# Patient Record
Sex: MALE | Race: WHITE | NOT HISPANIC OR LATINO | Employment: FULL TIME | URBAN - METROPOLITAN AREA
[De-identification: names, ages, dates, MRNs, and addresses within clinical notes are randomized per-mention and may not be internally consistent; named-entity substitution may affect disease eponyms.]

---

## 2017-03-17 ENCOUNTER — HOSPITAL ENCOUNTER (OUTPATIENT)
Dept: NON INVASIVE DIAGNOSTICS | Facility: CLINIC | Age: 47
Discharge: HOME | End: 2017-03-17
Payer: COMMERCIAL

## 2017-03-17 DIAGNOSIS — R07.9 CHEST PAIN: ICD-10-CM

## 2017-03-17 LAB
CHEST PAIN STATEMENT: NORMAL
MAX DIASTOLIC BP: 84 MMHG
MAX HEART RATE: 157 BPM
MAX PREDICTED HEART RATE: 175 BPM
MAX. SYSTOLIC BP: 160 MMHG
PROTOCOL NAME: NORMAL
TARGET HR FORMULA: NORMAL
TEST INDICATION: NORMAL
TIME IN EXERCISE PHASE: 540 S

## 2017-03-17 PROCEDURE — 93017 CV STRESS TEST TRACING ONLY: CPT

## 2017-03-22 ENCOUNTER — TRANSCRIBE ORDERS (OUTPATIENT)
Dept: LAB | Facility: OTHER | Age: 47
End: 2017-03-22

## 2017-03-22 ENCOUNTER — APPOINTMENT (OUTPATIENT)
Dept: LAB | Facility: OTHER | Age: 47
End: 2017-03-22
Payer: COMMERCIAL

## 2017-03-22 DIAGNOSIS — E87.6 HYPOKALEMIA: ICD-10-CM

## 2017-03-22 LAB
ANION GAP SERPL CALCULATED.3IONS-SCNC: 9 MMOL/L (ref 4–13)
BUN SERPL-MCNC: 19 MG/DL (ref 5–25)
CALCIUM SERPL-MCNC: 9.3 MG/DL (ref 8.3–10.1)
CHLORIDE SERPL-SCNC: 101 MMOL/L (ref 100–108)
CO2 SERPL-SCNC: 27 MMOL/L (ref 21–32)
CREAT SERPL-MCNC: 1.3 MG/DL (ref 0.6–1.3)
GFR SERPL CREATININE-BSD FRML MDRD: 59.7 ML/MIN/1.73SQ M
GLUCOSE P FAST SERPL-MCNC: 96 MG/DL (ref 65–99)
POTASSIUM SERPL-SCNC: 4 MMOL/L (ref 3.5–5.3)
SODIUM SERPL-SCNC: 137 MMOL/L (ref 136–145)

## 2017-03-22 PROCEDURE — 36415 COLL VENOUS BLD VENIPUNCTURE: CPT

## 2017-03-22 PROCEDURE — 80048 BASIC METABOLIC PNL TOTAL CA: CPT

## 2017-03-24 ENCOUNTER — ALLSCRIPTS OFFICE VISIT (OUTPATIENT)
Dept: OTHER | Facility: OTHER | Age: 47
End: 2017-03-24

## 2017-07-01 ENCOUNTER — APPOINTMENT (OUTPATIENT)
Dept: LAB | Facility: HOSPITAL | Age: 47
End: 2017-07-01
Payer: COMMERCIAL

## 2017-07-01 ENCOUNTER — TRANSCRIBE ORDERS (OUTPATIENT)
Dept: ADMINISTRATIVE | Facility: HOSPITAL | Age: 47
End: 2017-07-01

## 2017-07-01 DIAGNOSIS — Z00.8 HEALTH EXAMINATION IN POPULATION SURVEY: Primary | ICD-10-CM

## 2017-07-01 DIAGNOSIS — Z00.8 HEALTH EXAMINATION IN POPULATION SURVEY: ICD-10-CM

## 2017-07-01 LAB
CHOLEST SERPL-MCNC: 232 MG/DL (ref 50–200)
EST. AVERAGE GLUCOSE BLD GHB EST-MCNC: 103 MG/DL
HBA1C MFR BLD: 5.2 % (ref 4.2–6.3)
HDLC SERPL-MCNC: 46 MG/DL (ref 40–60)
LDLC SERPL CALC-MCNC: 160 MG/DL (ref 0–100)
TRIGL SERPL-MCNC: 130 MG/DL

## 2017-07-01 PROCEDURE — 80061 LIPID PANEL: CPT

## 2017-07-01 PROCEDURE — 36415 COLL VENOUS BLD VENIPUNCTURE: CPT

## 2017-07-01 PROCEDURE — 83036 HEMOGLOBIN GLYCOSYLATED A1C: CPT

## 2018-01-14 VITALS
SYSTOLIC BLOOD PRESSURE: 104 MMHG | RESPIRATION RATE: 16 BRPM | WEIGHT: 176 LBS | BODY MASS INDEX: 27.57 KG/M2 | HEART RATE: 80 BPM | DIASTOLIC BLOOD PRESSURE: 78 MMHG
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
## 2018-02-04 DIAGNOSIS — I10 ESSENTIAL HYPERTENSION: Primary | ICD-10-CM

## 2018-02-04 RX ORDER — TRIAMTERENE AND HYDROCHLOROTHIAZIDE 37.5; 25 MG/1; MG/1
TABLET ORAL
Qty: 90 TABLET | Refills: 0 | Status: SHIPPED | OUTPATIENT
Start: 2018-02-04 | End: 2018-05-14 | Stop reason: SDUPTHER

## 2018-03-16 DIAGNOSIS — I10 ESSENTIAL HYPERTENSION: Primary | ICD-10-CM

## 2018-03-19 DIAGNOSIS — I10 ESSENTIAL HYPERTENSION: ICD-10-CM

## 2018-03-19 RX ORDER — LOSARTAN POTASSIUM 50 MG/1
TABLET ORAL
Qty: 90 TABLET | Refills: 1 | Status: SHIPPED | OUTPATIENT
Start: 2018-03-19 | End: 2018-12-24 | Stop reason: SDUPTHER

## 2018-03-19 RX ORDER — LOSARTAN POTASSIUM 50 MG/1
TABLET ORAL
Qty: 90 TABLET | Refills: 0 | Status: SHIPPED | OUTPATIENT
Start: 2018-03-19 | End: 2018-03-19 | Stop reason: SDUPTHER

## 2018-05-14 DIAGNOSIS — I10 ESSENTIAL HYPERTENSION: ICD-10-CM

## 2018-05-14 RX ORDER — TRIAMTERENE AND HYDROCHLOROTHIAZIDE 37.5; 25 MG/1; MG/1
TABLET ORAL
Qty: 90 TABLET | Refills: 0 | Status: SHIPPED | OUTPATIENT
Start: 2018-05-14 | End: 2018-08-16 | Stop reason: SDUPTHER

## 2018-06-15 ENCOUNTER — APPOINTMENT (OUTPATIENT)
Dept: RADIOLOGY | Facility: CLINIC | Age: 48
End: 2018-06-15
Payer: COMMERCIAL

## 2018-06-15 ENCOUNTER — OFFICE VISIT (OUTPATIENT)
Dept: URGENT CARE | Facility: CLINIC | Age: 48
End: 2018-06-15
Payer: COMMERCIAL

## 2018-06-15 VITALS
OXYGEN SATURATION: 98 % | WEIGHT: 177 LBS | TEMPERATURE: 98.6 F | BODY MASS INDEX: 27.78 KG/M2 | HEART RATE: 78 BPM | DIASTOLIC BLOOD PRESSURE: 90 MMHG | RESPIRATION RATE: 18 BRPM | SYSTOLIC BLOOD PRESSURE: 134 MMHG | HEIGHT: 67 IN

## 2018-06-15 DIAGNOSIS — T14.90XA TRAUMA: Primary | ICD-10-CM

## 2018-06-15 DIAGNOSIS — M54.50 ACUTE LEFT-SIDED LOW BACK PAIN WITHOUT SCIATICA: ICD-10-CM

## 2018-06-15 PROCEDURE — 72100 X-RAY EXAM L-S SPINE 2/3 VWS: CPT

## 2018-06-15 PROCEDURE — S9088 SERVICES PROVIDED IN URGENT: HCPCS | Performed by: PHYSICIAN ASSISTANT

## 2018-06-15 PROCEDURE — 99213 OFFICE O/P EST LOW 20 MIN: CPT | Performed by: PHYSICIAN ASSISTANT

## 2018-06-15 PROCEDURE — 72072 X-RAY EXAM THORAC SPINE 3VWS: CPT

## 2018-06-15 RX ORDER — KETOROLAC TROMETHAMINE 30 MG/ML
30 INJECTION, SOLUTION INTRAMUSCULAR; INTRAVENOUS ONCE
Status: COMPLETED | OUTPATIENT
Start: 2018-06-15 | End: 2018-06-15

## 2018-06-15 RX ORDER — TRAMADOL HYDROCHLORIDE 50 MG/1
50 TABLET ORAL EVERY 6 HOURS PRN
Qty: 12 TABLET | Refills: 0 | Status: SHIPPED | OUTPATIENT
Start: 2018-06-15 | End: 2019-01-16

## 2018-06-15 RX ADMIN — KETOROLAC TROMETHAMINE 30 MG: 30 INJECTION, SOLUTION INTRAMUSCULAR; INTRAVENOUS at 19:26
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
## 2018-06-15 NOTE — PATIENT INSTRUCTIONS
Thoracolumbar x-ray performed in office-  Patient was given Toradol IM 30 mg for pain in office  Closely monitor for signs and symptoms of a concussion  If back pain worsens or there are other neurologic changes immediately proceed to the emergency room  Follow up with PCP in 3-5 days  Acute Low Back Pain   AMBULATORY CARE:   Acute low back pain  is sudden discomfort in your lower back area that lasts for up to 6 weeks  The discomfort makes it difficult to tolerate activity  Common symptoms include the following:   · Back stiffness or spasms    · Pain down the back or side of one leg    · Holding yourself in an unusual position or posture to decrease your back pain    · Not being able to find a sitting position that is comfortable    · Slow increase in your pain for 24 to 48 hours after you stress your back    · Tenderness on your lower back or severe pain when you move your back  Seek immediate care for the following symptoms:   · Severe pain    · Sudden stiffness and heaviness in both buttocks down to both legs    · Numbness or weakness in one leg, or pain in both legs    · Numbness in your genital area or across your lower back    · Unable to control your urine or bowel movements  Contact your healthcare provider if:   · You have a fever  · You have pain at night or when you rest     · Your pain does not get better with treatment  · You have pain that worsens when you cough or sneeze  · You suddenly feel something pop or snap in your back  · You have questions or concerns about your condition or care  The goal of treatment for acute low back pain  is to relieve your pain and help you tolerate activity  Most people with acute lower back pain get better within 4 to 6 weeks  You may need any of the following:  · Acetaminophen  decreases pain  It is available without a doctor's order  Ask how much to take and how often to take it  Follow directions   Acetaminophen can cause liver damage if not taken correctly  · NSAIDs  help decrease swelling and pain  This medicine is available with or without a doctor's order  NSAIDs can cause stomach bleeding or kidney problems in certain people  If you take blood thinner medicine, always ask your healthcare provider if NSAIDs are safe for you  Always read the medicine label and follow directions  · Prescription pain medicine  may be given  Ask your healthcare provider how to take this medicine safely  · Muscle relaxers  decrease pain by relaxing the muscles in your lower spine  Manage your symptoms:   · Stay active  as much as you can without causing more pain  Bed rest could make your back pain worse  Start with some light exercises such as walking  Avoid heavy lifting until your pain is gone  Ask for more information about the activities or exercises that are right for you  · Ice  helps decrease swelling, pain, and muscle spams  Put crushed ice in a plastic bag  Cover it with a towel  Place it on your lower back for 20 to 30 minutes every 2 hours  Do this for about 2 to 3 days after your pain starts, or as directed  · Heat  helps decrease pain and muscle spasms  Start to use heat after treatment with ice has stopped  Use a small towel dampened with warm water or a heating pad, or sit in a warm bath  Apply heat on the area for 20 to 30 minutes every 2 hours for as many days as directed  Alternate heat and ice  Prevent acute low back pain:   · Use proper body mechanics  ¨ Bend at the hips and knees when you  objects  Do not bend from the waist  Use your leg muscles as you lift the load  Do not use your back  Keep the object close to your chest as you lift it  Try not to twist or lift anything above your waist     ¨ Change your position often when you stand for long periods of time  Rest one foot on a small box or footrest, and then switch to the other foot often  ¨ Try not to sit for long periods of time   When you do, sit in a straight-backed chair with your feet flat on the floor  Never reach, pull, or push while you are sitting  · Do exercises that strengthen your back muscles  Warm up before you exercise  Ask your healthcare provider the best exercises for you  · Maintain a healthy weight  Ask your healthcare provider how much you should weigh  Ask him to help you create a weight loss plan if you are overweight  Follow up with your healthcare provider as directed:  Return for a follow-up visit if you still have pain after 1 to 3 weeks of treatment  You may need to visit an orthopedist if your back pain lasts more than 12 weeks  Write down your questions so you remember to ask them during your visits  © 2017 [address] Information is for End User's use only and may not be sold, redistributed or otherwise used for commercial purposes  All illustrations and images included in CareNotes® are the copyrighted property of A D [employer]  or [address]  The above information is an  only  It is not intended as medical advice for individual conditions or treatments  Talk to your doctor, nurse or pharmacist before following any medical regimen to see if it is safe and effective for you  Concussion   AMBULATORY CARE:   A concussion  is a mild brain injury  It is usually caused by a bump or blow to the head from a fall, a motor vehicle crash, or a sports injury  Sometimes being forcefully shaken may cause a concussion  Common symptoms include the following:  Symptoms may occur right away, or they may appear days after the concussion   After the injury, you may have any of these symptoms:  · A mild to moderate headache    · Dizziness, loss of balance, or blurry vision    · Nausea or vomiting     · A change in mood, such as restlessness or irritability    · Trouble thinking, remembering things, or concentrating    · Ringing in the ears    · Drowsiness or decreased energy    · Changes in your normal sleeping pattern  Have someone else call 911 for the following:   · Someone tries to wake you and cannot do so  · You have a seizure, increasing confusion, or a change in personality  · Your speech becomes slurred, or you have new vision problems  Seek care immediately if:   · You have a severe headache that does not go away  · Someone tries to wake you and cannot do so  · You have a seizure, increasing confusion, or a change in personality  · Your speech becomes slurred, or you have new vision problems  · You have arm or leg weakness, numbness, or new problems with coordination  · You have blood or clear fluid coming out of the ears or nose  Contact your healthcare provider if:   · You have nausea or are vomiting  · You feel more sleepy than usual     · Your symptoms get worse  · Your symptoms last longer than 6 weeks after the injury  · You have questions or concerns about your condition or care  Manage a concussion:  Usually no treatment is needed for a mild concussion  Concussion symptoms usually go away within about 10 days  The following may be recommended to manage your symptoms:  · Rest  from physical and mental activities as directed  Mental activities are those that require thinking, concentration, and attention  You will need to rest until your symptoms are gone  Rest will allow you to recover from your concussion  Ask your healthcare provider when you can return to work and other daily activities  · Have someone stay with you for the first 24 hours after your injury  Your healthcare provider should be contacted if your symptoms get worse, or you develop new symptoms  · Do not participate in sports and physical activities until your healthcare provider says it is okay  They could make your symptoms worse or lead to another concussion  Your healthcare provider will tell you when it is okay for you to return to sports or physical activities  · Acetaminophen  helps to decrease pain  It is available without a doctor's order  Ask how much to take and how often to take it  Follow directions  Acetaminophen can cause liver damage if not taken correctly  · NSAIDs , such as ibuprofen, help decrease swelling and pain  NSAIDs can cause stomach bleeding or kidney problems in certain people  If you take blood thinner medicine, always ask your healthcare provider if NSAIDs are safe for you  Always read the medicine label and follow directions  Prevent another concussion:   · Wear protective sports equipment that fit properly  Helmets help decrease your risk of a serious brain injury  Talk to your healthcare provider about ways you can decrease your risk for a concussion if you play sports  · Wear your seat belt  every time you travel  This helps to decrease your risk of a head injury if you are in a car accident  Follow up with your healthcare provider as directed:  Write down your questions so you remember to ask them during your visits  © 2017 [address] Information is for End User's use only and may not be sold, redistributed or otherwise used for commercial purposes  All illustrations and images included in CareNotes® are the copyrighted property of [employer] , [employer]  or [employer]  The above information is an  only  It is not intended as medical advice for individual conditions or treatments  Talk to your doctor, nurse or pharmacist before following any medical regimen to see if it is safe and effective for you

## 2018-06-23 ENCOUNTER — APPOINTMENT (OUTPATIENT)
Dept: LAB | Facility: CLINIC | Age: 48
End: 2018-06-23

## 2018-06-23 ENCOUNTER — TRANSCRIBE ORDERS (OUTPATIENT)
Dept: ADMINISTRATIVE | Facility: HOSPITAL | Age: 48
End: 2018-06-23

## 2018-06-23 DIAGNOSIS — Z00.8 HEALTH EXAMINATION IN POPULATION SURVEY: Primary | ICD-10-CM

## 2018-06-23 DIAGNOSIS — Z00.8 HEALTH EXAMINATION IN POPULATION SURVEY: ICD-10-CM

## 2018-06-23 LAB
CHOLEST SERPL-MCNC: 245 MG/DL (ref 50–200)
EST. AVERAGE GLUCOSE BLD GHB EST-MCNC: 108 MG/DL
HBA1C MFR BLD: 5.4 % (ref 4.2–6.3)
HDLC SERPL-MCNC: 43 MG/DL (ref 40–60)
LDLC SERPL CALC-MCNC: 141 MG/DL (ref 0–100)
NONHDLC SERPL-MCNC: 202 MG/DL
TRIGL SERPL-MCNC: 305 MG/DL

## 2018-06-23 PROCEDURE — 36415 COLL VENOUS BLD VENIPUNCTURE: CPT

## 2018-06-23 PROCEDURE — 80061 LIPID PANEL: CPT

## 2018-06-23 PROCEDURE — 83036 HEMOGLOBIN GLYCOSYLATED A1C: CPT

## 2018-08-16 DIAGNOSIS — I10 ESSENTIAL HYPERTENSION: ICD-10-CM

## 2018-08-21 RX ORDER — TRIAMTERENE AND HYDROCHLOROTHIAZIDE 37.5; 25 MG/1; MG/1
TABLET ORAL
Qty: 90 TABLET | Refills: 0 | Status: SHIPPED | OUTPATIENT
Start: 2018-08-21 | End: 2018-11-18 | Stop reason: SDUPTHER

## 2018-11-18 DIAGNOSIS — I10 ESSENTIAL HYPERTENSION: ICD-10-CM

## 2018-11-18 RX ORDER — TRIAMTERENE AND HYDROCHLOROTHIAZIDE 37.5; 25 MG/1; MG/1
TABLET ORAL
Qty: 90 TABLET | Refills: 0 | Status: SHIPPED | OUTPATIENT
Start: 2018-11-18 | End: 2019-02-19 | Stop reason: SDUPTHER

## 2018-12-24 DIAGNOSIS — I10 ESSENTIAL HYPERTENSION: ICD-10-CM

## 2018-12-31 RX ORDER — LOSARTAN POTASSIUM 50 MG/1
TABLET ORAL
Qty: 90 TABLET | Refills: 1 | Status: SHIPPED | OUTPATIENT
Start: 2018-12-31 | End: 2019-07-14 | Stop reason: SDUPTHER

## 2019-01-16 ENCOUNTER — OFFICE VISIT (OUTPATIENT)
Dept: URGENT CARE | Facility: CLINIC | Age: 49
End: 2019-01-16
Payer: COMMERCIAL

## 2019-01-16 VITALS
RESPIRATION RATE: 18 BRPM | SYSTOLIC BLOOD PRESSURE: 112 MMHG | TEMPERATURE: 97.8 F | DIASTOLIC BLOOD PRESSURE: 82 MMHG | HEIGHT: 66 IN | BODY MASS INDEX: 28.61 KG/M2 | WEIGHT: 178 LBS | HEART RATE: 111 BPM | OXYGEN SATURATION: 97 %

## 2019-01-16 DIAGNOSIS — J06.9 UPPER RESPIRATORY TRACT INFECTION, UNSPECIFIED TYPE: Primary | ICD-10-CM

## 2019-01-16 LAB — S PYO AG THROAT QL: NEGATIVE

## 2019-01-16 PROCEDURE — 99213 OFFICE O/P EST LOW 20 MIN: CPT | Performed by: PHYSICIAN ASSISTANT

## 2019-01-16 PROCEDURE — S9088 SERVICES PROVIDED IN URGENT: HCPCS | Performed by: PHYSICIAN ASSISTANT

## 2019-01-16 PROCEDURE — 87070 CULTURE OTHR SPECIMN AEROBIC: CPT | Performed by: PHYSICIAN ASSISTANT

## 2019-01-16 RX ORDER — DEXTROMETHORPHAN HYDROBROMIDE AND PROMETHAZINE HYDROCHLORIDE 15; 6.25 MG/5ML; MG/5ML
5 SYRUP ORAL 4 TIMES DAILY PRN
Qty: 118 ML | Refills: 0 | Status: SHIPPED | OUTPATIENT
Start: 2019-01-16 | End: 2019-01-23 | Stop reason: ALTCHOICE

## 2019-01-16 RX ORDER — OMEPRAZOLE 20 MG/1
1 CAPSULE, DELAYED RELEASE ORAL DAILY
COMMUNITY

## 2019-01-16 NOTE — PROGRESS NOTES
[facility]        NAME: Silverio Castro is a 53 y o  male  : 1970    MRN: [id number]  DATE: 2019  TIME: 3:54 PM    Assessment and Plan   Upper respiratory tract infection, unspecified type [J06 9]  1  Upper respiratory tract infection, unspecified type  promethazine-dextromethorphan (PHENERGAN-DM) 6 25-15 mg/5 mL oral syrup    POCT rapid strepA    Throat culture         Patient Instructions     Neg rapid strep, likely viral  Continue tylenol and motrin  Follow up with PCP in 3-5 days  Proceed to  ER if symptoms worsen  Chief Complaint     Chief Complaint   Patient presents with    Sore Throat     x 3 days  complains of sore throat, BL ear pain, fever (max at home was 100)  History of Present Illness         71-year-old male complains of fever sore throat and ear pain for 3 days  No nausea vomiting  The cough keeping up at night  No chest pain or shortness of breath  He is taking over-the-counter cough syrup with no relief  He did get a flu shot this year  He has a history of tonsillectomy  Review of Systems   Review of Systems      Current Medications       Current Outpatient Prescriptions:     losartan (COZAAR) 50 mg tablet, TAKE 1 TABLET BY MOUTH DAILY  , Disp: 90 tablet, Rfl: 1    omeprazole (PriLOSEC) 20 mg delayed release capsule, Take 1 capsule by mouth daily, Disp: , Rfl:     triamterene-hydrochlorothiazide (MAXZIDE-25) 37 5-25 mg per tablet, TAKE 1 TABLET BY MOUTH DAILY AS DIRECTED , Disp: 90 tablet, Rfl: 0    promethazine-dextromethorphan (PHENERGAN-DM) 6 25-15 mg/5 mL oral syrup, Take 5 mL by mouth 4 (four) times a day as needed for cough, Disp: 118 mL, Rfl: 0    Current Allergies     Allergies as of 2019 - Reviewed 2019   Allergen Reaction Noted    Sulfa antibiotics  10/31/2016            The following portions of the patient's history were reviewed and updated as appropriate: allergies, current medications, past family history, past medical history, past social history, past surgical history and problem list      Past Medical History:   Diagnosis Date    Hypertension        Past Surgical History:   Procedure Laterality Date    CERVICAL DISC SURGERY         No family history on file  Medications have been verified  Objective   /82 (BP Location: Left arm, Patient Position: Sitting)   Pulse (!) 111   Temp 97 8 °F (36 6 °C) (Tympanic)   Resp 18   Ht 5' 6" (1 676 m)   Wt 80 7 kg (178 lb)   SpO2 97%   BMI 28 73 kg/m²        Physical Exam     Physical Exam   Constitutional: He appears well-developed and well-nourished  No distress  HENT:   Right Ear: Tympanic membrane, external ear and ear canal normal    Left Ear: Tympanic membrane, external ear and ear canal normal    Nose: Nose normal  Right sinus exhibits no maxillary sinus tenderness and no frontal sinus tenderness  Left sinus exhibits no maxillary sinus tenderness and no frontal sinus tenderness  Mouth/Throat: Posterior oropharyngeal erythema present  No oropharyngeal exudate or posterior oropharyngeal edema  Eyes: Pupils are equal, round, and reactive to light  Conjunctivae and EOM are normal  No scleral icterus  Neck: Normal range of motion  Neck supple  Cardiovascular: Normal rate, regular rhythm and normal heart sounds  Pulmonary/Chest: Effort normal and breath sounds normal  No respiratory distress  He has no wheezes  He has no rales  Abdominal: Soft  Bowel sounds are normal  He exhibits no distension and no mass  There is no tenderness  There is no rebound and no guarding  Lymphadenopathy:     He has no cervical adenopathy  Skin: Skin is warm and dry  No rash noted

## 2019-01-18 LAB — BACTERIA THROAT CULT: NORMAL

## 2019-01-23 ENCOUNTER — OFFICE VISIT (OUTPATIENT)
Dept: URGENT CARE | Facility: CLINIC | Age: 49
End: 2019-01-23
Payer: COMMERCIAL

## 2019-01-23 ENCOUNTER — APPOINTMENT (OUTPATIENT)
Dept: RADIOLOGY | Facility: CLINIC | Age: 49
End: 2019-01-23
Payer: COMMERCIAL

## 2019-01-23 VITALS
HEIGHT: 67 IN | SYSTOLIC BLOOD PRESSURE: 158 MMHG | BODY MASS INDEX: 27.94 KG/M2 | TEMPERATURE: 96.8 F | DIASTOLIC BLOOD PRESSURE: 102 MMHG | HEART RATE: 114 BPM | RESPIRATION RATE: 18 BRPM | WEIGHT: 178 LBS | OXYGEN SATURATION: 97 %

## 2019-01-23 DIAGNOSIS — H10.33 ACUTE CONJUNCTIVITIS OF BOTH EYES, UNSPECIFIED ACUTE CONJUNCTIVITIS TYPE: ICD-10-CM

## 2019-01-23 DIAGNOSIS — J32.9 RHINOSINUSITIS: ICD-10-CM

## 2019-01-23 DIAGNOSIS — R05.9 COUGH: Primary | ICD-10-CM

## 2019-01-23 DIAGNOSIS — J31.0 RHINOSINUSITIS: ICD-10-CM

## 2019-01-23 DIAGNOSIS — R05.9 COUGH: ICD-10-CM

## 2019-01-23 PROCEDURE — 71046 X-RAY EXAM CHEST 2 VIEWS: CPT

## 2019-01-23 PROCEDURE — S9088 SERVICES PROVIDED IN URGENT: HCPCS | Performed by: PHYSICIAN ASSISTANT

## 2019-01-23 PROCEDURE — 99213 OFFICE O/P EST LOW 20 MIN: CPT | Performed by: PHYSICIAN ASSISTANT

## 2019-01-23 RX ORDER — OFLOXACIN 3 MG/ML
1 SOLUTION/ DROPS OPHTHALMIC 4 TIMES DAILY
Qty: 5 ML | Refills: 0 | Status: SHIPPED | OUTPATIENT
Start: 2019-01-23 | End: 2019-05-15 | Stop reason: ALTCHOICE

## 2019-01-23 RX ORDER — AZITHROMYCIN 250 MG/1
TABLET, FILM COATED ORAL
Qty: 6 TABLET | Refills: 0 | Status: SHIPPED | OUTPATIENT
Start: 2019-01-23 | End: 2019-01-27

## 2019-01-23 RX ORDER — METHYLPREDNISOLONE 4 MG/1
TABLET ORAL
Qty: 21 TABLET | Refills: 0 | Status: SHIPPED | OUTPATIENT
Start: 2019-01-23 | End: 2019-05-15 | Stop reason: ALTCHOICE

## 2019-01-23 NOTE — PROGRESS NOTES
[facility]        NAME: Homero Mckee is a 53 y o  male  : 1970    MRN: [id number]  DATE: 2019  TIME: 9:30 AM    Assessment and Plan   Cough [R05]  1  Cough  XR chest pa & lateral    methylPREDNISolone 4 MG tablet therapy pack    azithromycin (ZITHROMAX) 250 mg tablet   2  Rhinosinusitis  methylPREDNISolone 4 MG tablet therapy pack    azithromycin (ZITHROMAX) 250 mg tablet   3  Acute conjunctivitis of both eyes, unspecified acute conjunctivitis type  ofloxacin (OCUFLOX) 0 3 % ophthalmic solution         Patient Instructions     Patient Instructions   Chest x-ray is clear  Due to 2 weeks of cough we can cover with azithromycin  We will use prednisone for the hoarse voice and congestion  Ocuflox this and for the pinkeye  Continue Mucinex D or Delsym over-the-counter  Follow up with PCP in 3-5 days  Proceed to  ER if symptoms worsen  Chief Complaint     Chief Complaint   Patient presents with    Cough     dry np    Cold Like Symptoms     going on 2 weeks  was here last week and has not improved   Eye Pain     woke up this morning with BL eyes pink and crusty  also itch and burn  History of Present Illness         53year-old male complains of 2 weeks of cough  He says he lost his voice  Says the cough is got worse  He reports a history of pneumonia and is concerned about that  Some chest congestion  No fever at home  He was taking promethazine from his last visit here  No nausea or vomiting  No shortness of breath        Review of Systems   Review of Systems      Current Medications       Current Outpatient Prescriptions:     losartan (COZAAR) 50 mg tablet, TAKE 1 TABLET BY MOUTH DAILY  , Disp: 90 tablet, Rfl: 1    omeprazole (PriLOSEC) 20 mg delayed release capsule, Take 1 capsule by mouth daily, Disp: , Rfl:     triamterene-hydrochlorothiazide (MAXZIDE-25) 37 5-25 mg per tablet, TAKE 1 TABLET BY MOUTH DAILY AS DIRECTED , Disp: 90 tablet, Rfl: 0   azithromycin (ZITHROMAX) 250 mg tablet, 2 tabs on day 1, then 1 tab daily for 4 days, Disp: 6 tablet, Rfl: 0    methylPREDNISolone 4 MG tablet therapy pack, Use as directed on package, Disp: 21 tablet, Rfl: 0    ofloxacin (OCUFLOX) 0 3 % ophthalmic solution, Apply 1 drop to eye 4 (four) times a day, Disp: 5 mL, Rfl: 0    Current Allergies     Allergies as of 01/23/2019 - Reviewed 01/23/2019   Allergen Reaction Noted    Sulfa antibiotics  10/31/2016            The following portions of the patient's history were reviewed and updated as appropriate: allergies, current medications, past family history, past medical history, past social history, past surgical history and problem list      Past Medical History:   Diagnosis Date    Hypertension        Past Surgical History:   Procedure Laterality Date    CERVICAL DISC SURGERY         No family history on file  Medications have been verified  Objective   BP (!) 158/102 (BP Location: Left arm, Patient Position: Sitting)   Pulse (!) 114   Temp (!) 96 8 °F (36 °C) (Temporal)   Resp 18   Ht 5' 7" (1 702 m)   Wt 80 7 kg (178 lb)   SpO2 97%   BMI 27 88 kg/m²        Physical Exam     Physical Exam   Constitutional: He appears well-developed and well-nourished  No distress  HENT:   Right Ear: Tympanic membrane, external ear and ear canal normal    Left Ear: Tympanic membrane, external ear and ear canal normal    Nose: Mucosal edema and rhinorrhea present  Right sinus exhibits no maxillary sinus tenderness and no frontal sinus tenderness  Left sinus exhibits no maxillary sinus tenderness and no frontal sinus tenderness  Mouth/Throat: Oropharynx is clear and moist  No posterior oropharyngeal erythema  Eyes: Pupils are equal, round, and reactive to light  EOM are normal  Right eye exhibits no chemosis and no discharge  Left eye exhibits no chemosis and no discharge  Right conjunctiva is injected  Left conjunctiva is injected  No scleral icterus     Neck: Normal range of motion  Neck supple  Cardiovascular: Normal rate, regular rhythm and normal heart sounds  Pulmonary/Chest: Effort normal and breath sounds normal  No respiratory distress  He has no wheezes  He has no rales  Coughing with mildly hoarse voice   Abdominal: Soft  Bowel sounds are normal  He exhibits no distension and no mass  There is no tenderness  There is no rebound and no guarding  Lymphadenopathy:     He has no cervical adenopathy  Skin: Skin is warm and dry  No rash noted

## 2019-01-23 NOTE — PATIENT INSTRUCTIONS
Chest x-ray is clear  Due to 2 weeks of cough we can cover with azithromycin  We will use prednisone for the hoarse voice and congestion  Ocuflox this and for the pinkeye  Continue Mucinex D or Delsym over-the-counter

## 2019-02-19 DIAGNOSIS — I10 ESSENTIAL HYPERTENSION: ICD-10-CM

## 2019-02-22 RX ORDER — TRIAMTERENE AND HYDROCHLOROTHIAZIDE 37.5; 25 MG/1; MG/1
TABLET ORAL
Qty: 90 TABLET | Refills: 5 | Status: SHIPPED | OUTPATIENT
Start: 2019-02-22 | End: 2020-04-13

## 2019-05-15 ENCOUNTER — OFFICE VISIT (OUTPATIENT)
Dept: FAMILY MEDICINE CLINIC | Facility: MEDICAL CENTER | Age: 49
End: 2019-05-15
Payer: COMMERCIAL

## 2019-05-15 VITALS
HEART RATE: 70 BPM | HEIGHT: 66 IN | BODY MASS INDEX: 28.37 KG/M2 | RESPIRATION RATE: 14 BRPM | SYSTOLIC BLOOD PRESSURE: 130 MMHG | WEIGHT: 176.5 LBS | DIASTOLIC BLOOD PRESSURE: 80 MMHG

## 2019-05-15 DIAGNOSIS — E78.1 ESSENTIAL HYPERTRIGLYCERIDEMIA: ICD-10-CM

## 2019-05-15 DIAGNOSIS — K76.0 FATTY LIVER DISEASE, NONALCOHOLIC: ICD-10-CM

## 2019-05-15 DIAGNOSIS — I10 ESSENTIAL HYPERTENSION: ICD-10-CM

## 2019-05-15 DIAGNOSIS — K21.9 GASTROESOPHAGEAL REFLUX DISEASE WITHOUT ESOPHAGITIS: Primary | ICD-10-CM

## 2019-05-15 DIAGNOSIS — Z13.29 SCREENING FOR THYROID DISORDER: ICD-10-CM

## 2019-05-15 PROCEDURE — 99396 PREV VISIT EST AGE 40-64: CPT | Performed by: FAMILY MEDICINE

## 2019-07-14 DIAGNOSIS — I10 ESSENTIAL HYPERTENSION: ICD-10-CM

## 2019-07-15 RX ORDER — LOSARTAN POTASSIUM 50 MG/1
TABLET ORAL
Qty: 90 TABLET | Refills: 0 | Status: SHIPPED | OUTPATIENT
Start: 2019-07-15 | End: 2019-11-01 | Stop reason: SDUPTHER

## 2019-10-10 ENCOUNTER — APPOINTMENT (OUTPATIENT)
Dept: LAB | Facility: CLINIC | Age: 49
End: 2019-10-10
Payer: COMMERCIAL

## 2019-10-10 DIAGNOSIS — K76.0 FATTY LIVER DISEASE, NONALCOHOLIC: ICD-10-CM

## 2019-10-10 DIAGNOSIS — I10 ESSENTIAL HYPERTENSION: ICD-10-CM

## 2019-10-10 DIAGNOSIS — Z13.29 SCREENING FOR THYROID DISORDER: ICD-10-CM

## 2019-10-10 DIAGNOSIS — E78.1 ESSENTIAL HYPERTRIGLYCERIDEMIA: ICD-10-CM

## 2019-10-10 DIAGNOSIS — K21.9 GASTROESOPHAGEAL REFLUX DISEASE WITHOUT ESOPHAGITIS: ICD-10-CM

## 2019-10-10 LAB
ALBUMIN SERPL BCP-MCNC: 4.5 G/DL (ref 3.5–5)
ALP SERPL-CCNC: 88 U/L (ref 46–116)
ALT SERPL W P-5'-P-CCNC: 102 U/L (ref 12–78)
ANION GAP SERPL CALCULATED.3IONS-SCNC: 7 MMOL/L (ref 4–13)
AST SERPL W P-5'-P-CCNC: 57 U/L (ref 5–45)
BILIRUB SERPL-MCNC: 0.68 MG/DL (ref 0.2–1)
BUN SERPL-MCNC: 14 MG/DL (ref 5–25)
CALCIUM SERPL-MCNC: 9.2 MG/DL (ref 8.3–10.1)
CHLORIDE SERPL-SCNC: 107 MMOL/L (ref 100–108)
CHOLEST SERPL-MCNC: 270 MG/DL (ref 50–200)
CO2 SERPL-SCNC: 27 MMOL/L (ref 21–32)
CREAT SERPL-MCNC: 1.23 MG/DL (ref 0.6–1.3)
GFR SERPL CREATININE-BSD FRML MDRD: 69 ML/MIN/1.73SQ M
GLUCOSE P FAST SERPL-MCNC: 82 MG/DL (ref 65–99)
HDLC SERPL-MCNC: 46 MG/DL (ref 40–60)
LDLC SERPL CALC-MCNC: 169 MG/DL (ref 0–100)
POTASSIUM SERPL-SCNC: 4 MMOL/L (ref 3.5–5.3)
PROT SERPL-MCNC: 7.4 G/DL (ref 6.4–8.2)
SODIUM SERPL-SCNC: 141 MMOL/L (ref 136–145)
TRIGL SERPL-MCNC: 275 MG/DL
TSH SERPL DL<=0.05 MIU/L-ACNC: 0.9 UIU/ML (ref 0.36–3.74)

## 2019-10-10 PROCEDURE — 80053 COMPREHEN METABOLIC PANEL: CPT

## 2019-10-10 PROCEDURE — 84443 ASSAY THYROID STIM HORMONE: CPT

## 2019-10-10 PROCEDURE — 80061 LIPID PANEL: CPT

## 2019-10-10 PROCEDURE — 36415 COLL VENOUS BLD VENIPUNCTURE: CPT

## 2019-11-01 DIAGNOSIS — I10 ESSENTIAL HYPERTENSION: ICD-10-CM

## 2019-11-04 DIAGNOSIS — I10 ESSENTIAL HYPERTENSION: ICD-10-CM

## 2019-11-04 RX ORDER — LOSARTAN POTASSIUM 50 MG/1
TABLET ORAL
Qty: 90 TABLET | Refills: 0 | Status: SHIPPED | OUTPATIENT
Start: 2019-11-04 | End: 2019-11-04 | Stop reason: SDUPTHER

## 2019-11-04 NOTE — TELEPHONE ENCOUNTER
Pt leaving on vacation, and would like to  at [facility]  I see it was already sent to [facility], spoke with Javier and they said you should resend the rx to [facility]

## 2019-11-06 RX ORDER — LOSARTAN POTASSIUM 50 MG/1
50 TABLET ORAL DAILY
Qty: 90 TABLET | Refills: 0 | Status: SHIPPED | OUTPATIENT
Start: 2019-11-06 | End: 2020-01-27 | Stop reason: SDUPTHER

## 2019-11-18 ENCOUNTER — OFFICE VISIT (OUTPATIENT)
Dept: FAMILY MEDICINE CLINIC | Facility: MEDICAL CENTER | Age: 49
End: 2019-11-18
Payer: COMMERCIAL

## 2019-11-18 VITALS
HEART RATE: 90 BPM | BODY MASS INDEX: 28.45 KG/M2 | RESPIRATION RATE: 16 BRPM | SYSTOLIC BLOOD PRESSURE: 136 MMHG | DIASTOLIC BLOOD PRESSURE: 78 MMHG | HEIGHT: 66 IN | WEIGHT: 177 LBS

## 2019-11-18 DIAGNOSIS — K76.0 FATTY LIVER DISEASE, NONALCOHOLIC: Primary | ICD-10-CM

## 2019-11-18 DIAGNOSIS — E78.1 ESSENTIAL HYPERTRIGLYCERIDEMIA: ICD-10-CM

## 2019-11-18 PROCEDURE — 99214 OFFICE O/P EST MOD 30 MIN: CPT | Performed by: FAMILY MEDICINE

## 2019-11-19 NOTE — PROGRESS NOTES
I asked the patient to come in for discussion  He has elevated liver enzymes  It turns out that he knows that he has fatty liver, his gastroenterologist had confirm that several years ago  He has hypertriglyceridemia with some hyperlipidemia  We had a taina discussion and he is drinking at least 4-5 drinks per day  Beer  Past medical history, past surgical history, family medical history, social history, and medication list were all reviewed  Review of systems for GI  cardiac pulmonary and neurologic systems are all negative  ENT review of systems is also negative  /78 (BP Location: Left arm, Patient Position: Sitting, Cuff Size: Adult)   Pulse 90   Resp 16   Ht 5' 6" (1 676 m)   Wt 80 3 kg (177 lb)   BMI 28 57 kg/m²     HEENT examination is within normal limits no acute findings  Neck was supple  Chest clear  Cardiac exam revealed a regular rate and rhythm without murmur rub or gallop  Abdomen is soft and nontender  Extended discussion today regarding alcohol consumption, fatty liver and its consequences  This could be a red flag for possibility of cirrhosis down the road  I have advised that he quit drinking or at a minimum cut it down to 1-2 drinks a day  Will have a back in three months, check triglycerides and liver enzymes at that time

## 2020-01-24 ENCOUNTER — TELEPHONE (OUTPATIENT)
Dept: FAMILY MEDICINE CLINIC | Facility: MEDICAL CENTER | Age: 50
End: 2020-01-24

## 2020-01-27 ENCOUNTER — OFFICE VISIT (OUTPATIENT)
Dept: FAMILY MEDICINE CLINIC | Facility: MEDICAL CENTER | Age: 50
End: 2020-01-27
Payer: COMMERCIAL

## 2020-01-27 VITALS
HEART RATE: 89 BPM | BODY MASS INDEX: 28.96 KG/M2 | HEIGHT: 66 IN | WEIGHT: 180.2 LBS | TEMPERATURE: 98.4 F | SYSTOLIC BLOOD PRESSURE: 140 MMHG | DIASTOLIC BLOOD PRESSURE: 94 MMHG | OXYGEN SATURATION: 98 %

## 2020-01-27 DIAGNOSIS — G47.30 SLEEP APNEA, UNSPECIFIED TYPE: ICD-10-CM

## 2020-01-27 DIAGNOSIS — I10 ESSENTIAL HYPERTENSION: Primary | ICD-10-CM

## 2020-01-27 DIAGNOSIS — J32.9 RHINOSINUSITIS: ICD-10-CM

## 2020-01-27 DIAGNOSIS — K21.9 GASTROESOPHAGEAL REFLUX DISEASE WITHOUT ESOPHAGITIS: ICD-10-CM

## 2020-01-27 DIAGNOSIS — J31.0 RHINOSINUSITIS: ICD-10-CM

## 2020-01-27 DIAGNOSIS — K76.0 FATTY LIVER DISEASE, NONALCOHOLIC: ICD-10-CM

## 2020-01-27 PROCEDURE — 99214 OFFICE O/P EST MOD 30 MIN: CPT | Performed by: FAMILY MEDICINE

## 2020-01-27 RX ORDER — LOSARTAN POTASSIUM 100 MG/1
100 TABLET ORAL DAILY
Qty: 90 TABLET | Refills: 1 | Status: SHIPPED | OUTPATIENT
Start: 2020-01-27 | End: 2020-01-31 | Stop reason: SDUPTHER

## 2020-01-29 NOTE — PROGRESS NOTES
Assessment/Plan:      Diagnoses and all orders for this visit:    Sleep apnea, unspecified type  -     Ambulatory referral to Sleep Medicine; Future    Essential hypertension  -     losartan (COZAAR) 100 MG tablet; Take 1 tablet (100 mg total) by mouth daily    Gastroesophageal reflux disease without esophagitis    Fatty liver disease, nonalcoholic          Subjective:     Patient ID: Narendra Rodriguez is a 51 y o  male  Hypertension    Patient has hypertension his blood pressures have gone up slightly  He is concerned that it may be causing his headaches as well  He also had a small subconjunctival hemorrhage on the left eye  Will increase losartan from 50 mg to 100 mg  Will continue Dyazide  Will have back for blood pressure check in 4-6 weeks     URI    Patient has had cold symptoms for several days they are improving  He had runny nose cough and scratchy throat  No high fevers  Cold precautions were given  Sleep apnea    Patient's wife is concerned about sleep apnea  He snores very loudly she thinks that he does stop breathing occasionally  Request sleep study  [facility] was [facility] to [facility]  Elevated liver enzymes    Patient has been told that he has fatty liver  He was imaged  Will check another comprehensive metabolic profile  I have cautioned against excess alcohol and how weight loss will help as well  GERD    Patient is doing well with OTC omeprazole  Will continue using that  Avoid foods that exacerbate symptoms  Past medical history, past surgical history, family medical history, social history, and medication list were all reviewed  Review of Systems   Constitutional: Positive for fatigue and fever  Negative for activity change  Cold symptoms present for several days  HENT: Positive for congestion, postnasal drip, rhinorrhea and sore throat  Negative for ear discharge, ear pain, sinus pain and sneezing      Eyes: Negative for photophobia, pain, discharge and redness  Respiratory: Positive for cough  Negative for apnea, chest tightness, shortness of breath and wheezing  Cardiovascular: Negative for chest pain and palpitations  Gastrointestinal: Negative for abdominal pain, blood in stool, constipation, diarrhea, nausea and vomiting  Endocrine: Negative for polydipsia, polyphagia and polyuria  Genitourinary: Negative for decreased urine volume, difficulty urinating, discharge, dysuria, frequency, penile pain and urgency  Musculoskeletal: Negative for arthralgias, gait problem, joint swelling and neck pain  Skin: Negative for color change and rash  Neurological: Negative for dizziness, tremors, seizures, weakness and headaches  Psychiatric/Behavioral: Negative for agitation and sleep disturbance  The patient is not nervous/anxious  Objective:    /94 (BP Location: Left arm, Patient Position: Sitting, Cuff Size: Adult)   Pulse 89   Temp 98 4 °F (36 9 °C)   Ht 5' 6" (1 676 m)   Wt 81 7 kg (180 lb 3 2 oz)   SpO2 98%   BMI 29 09 kg/m²      Physical Exam   HENT:   Right Ear: Hearing, tympanic membrane, external ear and ear canal normal    Left Ear: Hearing, tympanic membrane, external ear and ear canal normal    Nose: No mucosal edema or rhinorrhea  Mouth/Throat: Posterior oropharyngeal edema and posterior oropharyngeal erythema present  No oropharyngeal exudate  Eyes: Pupils are equal, round, and reactive to light  Conjunctivae are normal    Resolving subconjunctival hemorrhage left eye, nasal aspect  Neck: No thyromegaly present  Cardiovascular: Normal rate, regular rhythm and normal heart sounds  Pulmonary/Chest: Effort normal and breath sounds normal  No accessory muscle usage  No respiratory distress  He has no wheezes  He has no rales  Lymphadenopathy:     He has no cervical adenopathy

## 2020-01-31 DIAGNOSIS — I10 ESSENTIAL HYPERTENSION: ICD-10-CM

## 2020-01-31 RX ORDER — LOSARTAN POTASSIUM 100 MG/1
100 TABLET ORAL DAILY
Qty: 90 TABLET | Refills: 1 | Status: SHIPPED | OUTPATIENT
Start: 2020-01-31 | End: 2020-05-11 | Stop reason: SDUPTHER

## 2020-02-13 ENCOUNTER — APPOINTMENT (OUTPATIENT)
Dept: LAB | Facility: CLINIC | Age: 50
End: 2020-02-13
Payer: COMMERCIAL

## 2020-02-13 DIAGNOSIS — K76.0 FATTY LIVER DISEASE, NONALCOHOLIC: ICD-10-CM

## 2020-02-13 LAB
ALBUMIN SERPL BCP-MCNC: 4.2 G/DL (ref 3.5–5)
ALP SERPL-CCNC: 96 U/L (ref 46–116)
ALT SERPL W P-5'-P-CCNC: 68 U/L (ref 12–78)
ANION GAP SERPL CALCULATED.3IONS-SCNC: 9 MMOL/L (ref 4–13)
AST SERPL W P-5'-P-CCNC: 36 U/L (ref 5–45)
BILIRUB SERPL-MCNC: 0.95 MG/DL (ref 0.2–1)
BUN SERPL-MCNC: 29 MG/DL (ref 5–25)
CALCIUM SERPL-MCNC: 10 MG/DL (ref 8.3–10.1)
CHLORIDE SERPL-SCNC: 102 MMOL/L (ref 100–108)
CHOLEST SERPL-MCNC: 277 MG/DL (ref 50–200)
CO2 SERPL-SCNC: 26 MMOL/L (ref 21–32)
CREAT SERPL-MCNC: 2.41 MG/DL (ref 0.6–1.3)
GFR SERPL CREATININE-BSD FRML MDRD: 31 ML/MIN/1.73SQ M
GLUCOSE P FAST SERPL-MCNC: 98 MG/DL (ref 65–99)
HDLC SERPL-MCNC: 43 MG/DL
LDLC SERPL CALC-MCNC: 178 MG/DL (ref 0–100)
POTASSIUM SERPL-SCNC: 4.4 MMOL/L (ref 3.5–5.3)
PROT SERPL-MCNC: 8 G/DL (ref 6.4–8.2)
SODIUM SERPL-SCNC: 137 MMOL/L (ref 136–145)
TRIGL SERPL-MCNC: 282 MG/DL

## 2020-02-13 PROCEDURE — 36415 COLL VENOUS BLD VENIPUNCTURE: CPT

## 2020-02-13 PROCEDURE — 80053 COMPREHEN METABOLIC PANEL: CPT

## 2020-02-13 PROCEDURE — 80061 LIPID PANEL: CPT

## 2020-02-25 ENCOUNTER — CONSULT (OUTPATIENT)
Dept: PULMONOLOGY | Facility: CLINIC | Age: 50
End: 2020-02-25
Payer: COMMERCIAL

## 2020-02-25 VITALS
DIASTOLIC BLOOD PRESSURE: 80 MMHG | HEIGHT: 66 IN | HEART RATE: 88 BPM | SYSTOLIC BLOOD PRESSURE: 110 MMHG | OXYGEN SATURATION: 98 % | BODY MASS INDEX: 28.12 KG/M2 | WEIGHT: 175 LBS

## 2020-02-25 DIAGNOSIS — G47.19 EXCESSIVE DAYTIME SLEEPINESS: ICD-10-CM

## 2020-02-25 DIAGNOSIS — G47.33 OSA (OBSTRUCTIVE SLEEP APNEA): Primary | ICD-10-CM

## 2020-02-25 DIAGNOSIS — I10 ESSENTIAL HYPERTENSION: ICD-10-CM

## 2020-02-25 DIAGNOSIS — E66.3 OVERWEIGHT (BMI 25.0-29.9): ICD-10-CM

## 2020-02-25 PROCEDURE — 99244 OFF/OP CNSLTJ NEW/EST MOD 40: CPT | Performed by: PHYSICIAN ASSISTANT

## 2020-02-26 NOTE — PROGRESS NOTES
Assessment/Plan:     Diagnoses and all orders for this visit:    CORY (obstructive sleep apnea)  -     Diagnostic Sleep Study; Future    Overweight (BMI 25 0-29  9)    Excessive daytime sleepiness    Essential hypertension     Patient is here today for sleep evaluation  He does have signs and symptoms of obstructive sleep apnea with snoring , nighttime awakenings , excessive daytime sleepiness , chronic headaches   Orland sleepiness score is 11  Discussed the etiology and pathogenesis of sleep apnea, risks of untreated sleep apnea including arrhythmia, MI, stroke  He has been having problems with elevated blood pressure which could be related to untreated sleep apnea  Will send him for a diagnostic sleep study, if any evidence of obstructive sleep apnea will order him an auto CPAP   Also discussed the other treatment options including mandibular advancement device   He will follow up with us in 3 months or sooner if necessary  Return in about 3 months (around 5/25/2020)  All questions are answered to the patient's satisfaction and understanding  He verbalizes understanding  He is encouraged to call with any further questions or concerns  Portions of the record may have been created with voice recognition software  Occasional wrong word or "sound a like" substitutions may have occurred due to the inherent limitations of voice recognition software  Read the chart carefully and recognize, using context, where substitutions have occurred  a    Electronically Signed by Harlie Jeans, PA-C    ______________________________________________________________________    Chief Complaint:   Chief Complaint   Patient presents with    Sleep Apnea     ref dr Ruben Knowles         Patient ID: Oren Marie is a 51 y o  y o  male has a past medical history of Chronic kidney disease, GERD (gastroesophageal reflux disease), Hypertension, and CORY (obstructive sleep apnea)      2/25/2020  Patient presents today for initial visit  Patient is a 52 yo male with PMH of HTN, GERD  He was sent to us for sleep evaluation  He complains of snoring, night time awakenings, excessive daytime sleepiness  He wakes for the day at 4AM, tries to go to sleep between 6-7 PM    He has noticed that over time he has been having trouble staying awake in the evenings  He does note chronic headaches  He has also had some trouble with increasing blood pressures  primary symptoms   Associated symptoms include headaches  Pertinent negatives include no chest pain, fever, myalgias or sore throat  Occupational/Exposure history:  Pets/Enviroment:  Travel history:  Review of Systems   Constitutional: Negative  Negative for appetite change and fever  HENT: Positive for postnasal drip  Negative for ear pain, rhinorrhea, sneezing, sore throat and trouble swallowing  Respiratory: Negative  Cardiovascular: Negative  Negative for chest pain  Gastrointestinal: Negative  Genitourinary: Negative  Musculoskeletal: Negative  Negative for myalgias  Skin: Negative  Allergic/Immunologic: Negative  Neurological: Positive for headaches  Psychiatric/Behavioral: Negative  Social history: He reports that he quit smoking about a year ago  His smoking use included e-cigarettes  He quit after 10 00 years of use  He has never used smokeless tobacco  He reports that he drinks about 20 0 standard drinks of alcohol per week  He reports that he does not use drugs      Past surgical history:   Past Surgical History:   Procedure Laterality Date    CERVICAL DISC SURGERY      TONSILLECTOMY       Family history:   Family History   Problem Relation Age of Onset    No Known Problems Mother     Hypertension Family         Benign essential hypertension     Other Family     Heart disease Family     Lung disease Family     Cancer Family     Alcohol abuse Father     Thyroid cancer Sister     Alcohol abuse Brother        Immunization History Administered Date(s) Administered    INFLUENZA 10/01/2014, 10/14/2015, 10/05/2016, 09/19/2017, 09/25/2018    Influenza TIV (IM) 10/28/2013    Influenza, injectable, quadrivalent, preservative free 0 5 mL 09/20/2019     Current Outpatient Medications   Medication Sig Dispense Refill    losartan (COZAAR) 100 MG tablet Take 1 tablet (100 mg total) by mouth daily 90 tablet 1    omeprazole (PriLOSEC) 20 mg delayed release capsule Take 1 capsule by mouth daily      triamterene-hydrochlorothiazide (MAXZIDE-25) 37 5-25 mg per tablet TAKE 1 TABLET BY MOUTH DAILY AS DIRECTED  90 tablet 5     No current facility-administered medications for this visit  Allergies: Sulfa antibiotics    Objective:  Vitals:    02/25/20 1009   BP: 110/80   Pulse: 88   SpO2: 98%   Weight: 79 4 kg (175 lb)   Height: 5' 6" (1 676 m)   Oxygen Therapy  SpO2: 98 %    Wt Readings from Last 3 Encounters:   02/25/20 79 4 kg (175 lb)   01/27/20 81 7 kg (180 lb 3 2 oz)   11/18/19 80 3 kg (177 lb)     Body mass index is 28 25 kg/m²  Physical Exam   Constitutional: He is oriented to person, place, and time  He appears well-developed and well-nourished  No distress  HENT:   Mouth/Throat: Oropharynx is clear and moist    Crowded oropharyngeal airway, Mallampati 3   Eyes: Pupils are equal, round, and reactive to light  Neck:   Short and wide neck   Cardiovascular: Normal rate, regular rhythm and normal heart sounds  No murmur heard  Pulmonary/Chest: Effort normal and breath sounds normal  No accessory muscle usage  No respiratory distress  He has no decreased breath sounds  He has no wheezes  He has no rhonchi  He has no rales  Abdominal: Soft  There is no tenderness  Musculoskeletal: Normal range of motion  Neurological: He is alert and oriented to person, place, and time  Skin: Skin is warm and dry  No rash noted  Psychiatric: He has a normal mood and affect         Lab Review:   Lab Results   Component Value Date     (L) 01/08/2016    K 4 4 02/13/2020    K 4 4 01/08/2016     02/13/2020    CL 99 (L) 01/08/2016    CO2 26 02/13/2020    CO2 26 01/08/2016    BUN 29 (H) 02/13/2020    BUN 14 01/08/2016    CREATININE 2 41 (H) 02/13/2020    CREATININE 1 23 01/08/2016    GLUCOSE 90 01/08/2016    CALCIUM 10 0 02/13/2020    CALCIUM 9 0 01/08/2016     Lab Results   Component Value Date    WBC 11 77 (H) 10/31/2016    WBC 10 06 01/08/2016    HGB 15 3 10/31/2016    HGB 15 7 01/08/2016    HCT 42 9 10/31/2016    HCT 45 1 01/08/2016    MCV 95 10/31/2016    MCV 97 01/08/2016     10/31/2016     01/08/2016       Diagnostics:    none pertinent  Office Spirometry Results:     ESS: Total score: 11  No results found    Answers for HPI/ROS submitted by the patient on 2/18/2020   Primary symptoms  Chronicity: chronic  When did you first notice your symptoms?: more than 1 year ago  How often do your symptoms occur?: daily  Since you first noticed this problem, how has it changed?: waxing and waning  Do you have shortness of breath that occurs with effort or exertion?: No  Do you have ear congestion?: No  Do you have heartburn?: Yes  Do you have fatigue?: Yes  Do you have nasal congestion?: Yes  Do you have shortness of breath when lying flat?: No  Do you have shortness of breath when you wake up?: No  Do you have sweats?: No  Have you experienced weight loss?: No  Which of the following makes your symptoms worse?: lying down  Which of the following makes your symptoms better?: nothing

## 2020-03-12 ENCOUNTER — TELEPHONE (OUTPATIENT)
Dept: PULMONOLOGY | Facility: CLINIC | Age: 50
End: 2020-03-12

## 2020-03-12 DIAGNOSIS — G47.33 OSA (OBSTRUCTIVE SLEEP APNEA): Primary | ICD-10-CM

## 2020-03-17 ENCOUNTER — TRANSCRIBE ORDERS (OUTPATIENT)
Dept: SLEEP CENTER | Facility: CLINIC | Age: 50
End: 2020-03-17

## 2020-04-13 ENCOUNTER — TELEPHONE (OUTPATIENT)
Dept: FAMILY MEDICINE CLINIC | Facility: MEDICAL CENTER | Age: 50
End: 2020-04-13

## 2020-04-13 DIAGNOSIS — I10 ESSENTIAL HYPERTENSION: ICD-10-CM

## 2020-04-13 RX ORDER — TRIAMTERENE AND HYDROCHLOROTHIAZIDE 37.5; 25 MG/1; MG/1
TABLET ORAL
Qty: 90 TABLET | Refills: 0 | Status: SHIPPED | OUTPATIENT
Start: 2020-04-13 | End: 2020-06-25

## 2020-05-11 DIAGNOSIS — I10 ESSENTIAL HYPERTENSION: ICD-10-CM

## 2020-05-11 RX ORDER — LOSARTAN POTASSIUM 100 MG/1
100 TABLET ORAL DAILY
Qty: 10 TABLET | Refills: 0 | Status: SHIPPED | OUTPATIENT
Start: 2020-05-11 | End: 2020-07-27 | Stop reason: SDUPTHER

## 2020-06-03 ENCOUNTER — HOSPITAL ENCOUNTER (OUTPATIENT)
Dept: SLEEP CENTER | Facility: CLINIC | Age: 50
Discharge: HOME | End: 2020-06-03
Payer: COMMERCIAL

## 2020-06-03 ENCOUNTER — TELEPHONE (OUTPATIENT)
Dept: PULMONOLOGY | Facility: CLINIC | Age: 50
End: 2020-06-03

## 2020-06-03 DIAGNOSIS — G47.33 OSA (OBSTRUCTIVE SLEEP APNEA): ICD-10-CM

## 2020-06-03 PROCEDURE — G0399 HOME SLEEP TEST/TYPE 3 PORTA: HCPCS | Performed by: INTERNAL MEDICINE

## 2020-06-03 PROCEDURE — G0399 HOME SLEEP TEST/TYPE 3 PORTA: HCPCS

## 2020-06-06 DIAGNOSIS — G47.33 OSA (OBSTRUCTIVE SLEEP APNEA): Primary | ICD-10-CM

## 2020-06-08 ENCOUNTER — TELEPHONE (OUTPATIENT)
Dept: PULMONOLOGY | Facility: CLINIC | Age: 50
End: 2020-06-08

## 2020-06-24 DIAGNOSIS — I10 ESSENTIAL HYPERTENSION: ICD-10-CM

## 2020-06-25 RX ORDER — TRIAMTERENE AND HYDROCHLOROTHIAZIDE 37.5; 25 MG/1; MG/1
TABLET ORAL
Qty: 90 TABLET | Refills: 0 | Status: SHIPPED | OUTPATIENT
Start: 2020-06-25 | End: 2020-10-07

## 2020-06-29 ENCOUNTER — TELEPHONE (OUTPATIENT)
Dept: PULMONOLOGY | Facility: CLINIC | Age: 50
End: 2020-06-29

## 2020-06-30 ENCOUNTER — OFFICE VISIT (OUTPATIENT)
Dept: PULMONOLOGY | Facility: CLINIC | Age: 50
End: 2020-06-30
Payer: COMMERCIAL

## 2020-06-30 VITALS
OXYGEN SATURATION: 99 % | DIASTOLIC BLOOD PRESSURE: 82 MMHG | HEIGHT: 66 IN | HEART RATE: 72 BPM | TEMPERATURE: 97.8 F | SYSTOLIC BLOOD PRESSURE: 136 MMHG | BODY MASS INDEX: 27.97 KG/M2 | WEIGHT: 174 LBS

## 2020-06-30 DIAGNOSIS — G47.33 OSA (OBSTRUCTIVE SLEEP APNEA): Primary | ICD-10-CM

## 2020-06-30 DIAGNOSIS — I10 ESSENTIAL HYPERTENSION: ICD-10-CM

## 2020-06-30 DIAGNOSIS — E66.3 OVERWEIGHT WITH BODY MASS INDEX (BMI) OF 28 TO 28.9 IN ADULT: ICD-10-CM

## 2020-06-30 PROCEDURE — 99214 OFFICE O/P EST MOD 30 MIN: CPT | Performed by: PHYSICIAN ASSISTANT

## 2020-06-30 PROCEDURE — 1036F TOBACCO NON-USER: CPT | Performed by: PHYSICIAN ASSISTANT

## 2020-06-30 PROCEDURE — 3008F BODY MASS INDEX DOCD: CPT | Performed by: PHYSICIAN ASSISTANT

## 2020-06-30 PROCEDURE — 3079F DIAST BP 80-89 MM HG: CPT | Performed by: PHYSICIAN ASSISTANT

## 2020-06-30 PROCEDURE — 3075F SYST BP GE 130 - 139MM HG: CPT | Performed by: PHYSICIAN ASSISTANT

## 2020-07-15 ENCOUNTER — TELEPHONE (OUTPATIENT)
Dept: PULMONOLOGY | Facility: CLINIC | Age: 50
End: 2020-07-15

## 2020-07-20 ENCOUNTER — TELEPHONE (OUTPATIENT)
Dept: PULMONOLOGY | Facility: CLINIC | Age: 50
End: 2020-07-20

## 2020-07-23 ENCOUNTER — APPOINTMENT (OUTPATIENT)
Dept: RADIOLOGY | Facility: CLINIC | Age: 50
End: 2020-07-23
Payer: COMMERCIAL

## 2020-07-23 ENCOUNTER — OFFICE VISIT (OUTPATIENT)
Dept: URGENT CARE | Facility: CLINIC | Age: 50
End: 2020-07-23
Payer: COMMERCIAL

## 2020-07-23 VITALS
DIASTOLIC BLOOD PRESSURE: 89 MMHG | OXYGEN SATURATION: 98 % | BODY MASS INDEX: 27.62 KG/M2 | SYSTOLIC BLOOD PRESSURE: 148 MMHG | RESPIRATION RATE: 18 BRPM | TEMPERATURE: 101 F | HEIGHT: 67 IN | WEIGHT: 176 LBS | HEART RATE: 105 BPM

## 2020-07-23 DIAGNOSIS — R50.9 FEVER, UNSPECIFIED FEVER CAUSE: ICD-10-CM

## 2020-07-23 DIAGNOSIS — J18.9 PNEUMONIA OF LEFT LUNG DUE TO INFECTIOUS ORGANISM, UNSPECIFIED PART OF LUNG: Primary | ICD-10-CM

## 2020-07-23 PROCEDURE — U0003 INFECTIOUS AGENT DETECTION BY NUCLEIC ACID (DNA OR RNA); SEVERE ACUTE RESPIRATORY SYNDROME CORONAVIRUS 2 (SARS-COV-2) (CORONAVIRUS DISEASE [COVID-19]), AMPLIFIED PROBE TECHNIQUE, MAKING USE OF HIGH THROUGHPUT TECHNOLOGIES AS DESCRIBED BY CMS-2020-01-R: HCPCS

## 2020-07-23 PROCEDURE — G0383 LEV 4 HOSP TYPE B ED VISIT: HCPCS | Performed by: PHYSICIAN ASSISTANT

## 2020-07-23 PROCEDURE — 71046 X-RAY EXAM CHEST 2 VIEWS: CPT

## 2020-07-23 RX ORDER — LEVOFLOXACIN 500 MG/1
500 TABLET, FILM COATED ORAL EVERY 24 HOURS
Qty: 10 TABLET | Refills: 0 | Status: SHIPPED | OUTPATIENT
Start: 2020-07-23 | End: 2020-08-02

## 2020-07-23 NOTE — PATIENT INSTRUCTIONS
CXR shows left sided pneumonia  -Please go to tent for COVID swab- self isolate at home until we get the results back  -Take levaquin as directed   Given history of lung comorbitity of severe sleep apnea I am using levaquin   -Tylenol/Motrin  -Increase fluids  -Follow-up with PCP within 3-5 days    Go to ER with worsening symptoms, fever, chest pain, shortness of breath, or any signs of distress

## 2020-07-23 NOTE — PROGRESS NOTES
[facility]        NAME: Juan Miguel Quiroga is a 53 y o  male  : 1970    MRN: [id number]  DATE: 2020  TIME: 5:21 PM    Assessment and Plan   Pneumonia of left lung due to infectious organism, unspecified part of lung [J18 9]  1  Pneumonia of left lung due to infectious organism, unspecified part of lung  levofloxacin (LEVAQUIN) 500 mg tablet   2  Fever, unspecified fever cause  XR chest pa & lateral    MISC COVID-19 TEST- Collection at [facility]         Patient Instructions     Patient Instructions   CXR shows left sided pneumonia  -Please go to tent for COVID swab- self isolate at home until we get the results back  -Take levaquin as directed  Given history of lung comorbitity of severe sleep apnea I am using levaquin   -Tylenol/Motrin  -Increase fluids  -Follow-up with PCP within 3-5 days    Go to ER with worsening symptoms, fever, chest pain, shortness of breath, or any signs of distress     Follow up with PCP in 3-5 days  Proceed to  ER if symptoms worsen  Chief Complaint     Chief Complaint   Patient presents with    Fever     c/o of fevers on and off or the past two days  took tylenol 2 hours ago  History of Present Illness       The patient is a 44-year-old male medical history of sleep apnea and hypertension who presents today for evaluation of a fever  Patient states that he started a not feel well on Tuesday while at work and he vomited 1 time  Patient also started with diarrhea at that time as well  Patient states that yesterday he still continued to not feel well and started with a fever yesterday afternoon  Patient states that the fever continues today and he is having chills and body aches  Patient states that he has been taking Tylenol for his fever which he last took about 2 hours ago  The patient states that he feels very "winded" with doing his normal activities  Patient also admits to having headache as well  Patient admits to recent travel to [place]  Patient also works at [employer] and his wife works for [employer] in the lab  Review of Systems   Review of Systems   Constitutional: Positive for chills and fever  HENT: Negative for ear pain, rhinorrhea and sore throat  Respiratory: Positive for shortness of breath  Cardiovascular: Negative for chest pain  Gastrointestinal: Positive for diarrhea and vomiting  Negative for abdominal pain  Genitourinary: Negative for difficulty urinating and dysuria  Musculoskeletal: Positive for arthralgias  Skin: Negative for rash  Neurological: Positive for headaches  Negative for dizziness and light-headedness  All other systems reviewed and are negative          Current Medications       Current Outpatient Medications:     losartan (COZAAR) 100 MG tablet, Take 1 tablet (100 mg total) by mouth daily, Disp: 10 tablet, Rfl: 0    omeprazole (PriLOSEC) 20 mg delayed release capsule, Take 1 capsule by mouth daily, Disp: , Rfl:     triamterene-hydrochlorothiazide (MAXZIDE-25) 37 5-25 mg per tablet, TAKE 1 TABLET BY MOUTH DAILY AS DIRECTED , Disp: 90 tablet, Rfl: 0    levofloxacin (LEVAQUIN) 500 mg tablet, Take 1 tablet (500 mg total) by mouth every 24 hours for 10 days, Disp: 10 tablet, Rfl: 0    Current Allergies     Allergies as of 07/23/2020 - Reviewed 07/23/2020   Allergen Reaction Noted    Sulfa antibiotics  10/31/2016            The following portions of the patient's history were reviewed and updated as appropriate: allergies, current medications, past family history, past medical history, past social history, past surgical history and problem list      Past Medical History:   Diagnosis Date    Chronic kidney disease     Episode of Reduced GFR 2017    GERD (gastroesophageal reflux disease)     Hypertension     CORY (obstructive sleep apnea)     Sleep apnea        Past Surgical History:   Procedure Laterality Date    CERVICAL DISC SURGERY      TONSILLECTOMY         Family History   Problem Relation Age of Onset    No Known Problems Mother     Hypertension Family         Benign essential hypertension     Other Family     Heart disease Family     Lung disease Family     Cancer Family     Alcohol abuse Father     Thyroid cancer Sister     Alcohol abuse Brother          Medications have been verified  Objective   /89   Pulse 105   Temp (!) 101 °F (38 3 °C) (Oral)   Resp 18   Ht 5' 6 5" (1 689 m)   Wt 79 8 kg (176 lb)   SpO2 98%   BMI 27 98 kg/m²        Physical Exam     Physical Exam   Constitutional: He is oriented to person, place, and time  He appears well-developed and well-nourished  No distress  HENT:   Head: Normocephalic and atraumatic  Right Ear: Tympanic membrane, external ear and ear canal normal    Left Ear: Tympanic membrane, external ear and ear canal normal    Nose: Nose normal  Right sinus exhibits no maxillary sinus tenderness and no frontal sinus tenderness  Left sinus exhibits no maxillary sinus tenderness and no frontal sinus tenderness  Mouth/Throat: Uvula is midline, oropharynx is clear and moist and mucous membranes are normal    Eyes: Pupils are equal, round, and reactive to light  Conjunctivae and EOM are normal    Neck: Normal range of motion  Neck supple  Cardiovascular: Normal rate, regular rhythm and normal heart sounds  Pulmonary/Chest: Effort normal and breath sounds normal  No respiratory distress  He has no wheezes  Abdominal: Soft  Bowel sounds are normal  There is no tenderness  There is no rebound and no guarding  Musculoskeletal: Normal range of motion  Lymphadenopathy:     He has no cervical adenopathy  Neurological: He is alert and oriented to person, place, and time  Skin: Skin is warm and dry  No rash noted  Psychiatric: He has a normal mood and affect  Nursing note and vitals reviewed

## 2020-07-23 NOTE — LETTER
July 23, 2020     Patient: Ana Gonzales   YOB: 1970   Date of Visit: 7/23/2020       To Whom It May Concern: It is my medical opinion that Tanya Contreras should remain out of work until cleared  If you have any questions or concerns, please don't hesitate to call           Sincerely,        Ricardo Hahn PA-C    CC: No Recipients

## 2020-07-25 LAB — SARS-COV-2 RNA SPEC QL NAA+PROBE: NOT DETECTED

## 2020-07-27 DIAGNOSIS — I10 ESSENTIAL HYPERTENSION: ICD-10-CM

## 2020-07-28 ENCOUNTER — OFFICE VISIT (OUTPATIENT)
Dept: FAMILY MEDICINE CLINIC | Facility: MEDICAL CENTER | Age: 50
End: 2020-07-28
Payer: COMMERCIAL

## 2020-07-28 VITALS
DIASTOLIC BLOOD PRESSURE: 84 MMHG | HEIGHT: 67 IN | WEIGHT: 175 LBS | HEART RATE: 65 BPM | BODY MASS INDEX: 27.47 KG/M2 | TEMPERATURE: 98.2 F | SYSTOLIC BLOOD PRESSURE: 122 MMHG

## 2020-07-28 DIAGNOSIS — J18.9 COMMUNITY ACQUIRED PNEUMONIA OF LEFT UPPER LOBE OF LUNG: Primary | ICD-10-CM

## 2020-07-28 PROCEDURE — 3074F SYST BP LT 130 MM HG: CPT | Performed by: FAMILY MEDICINE

## 2020-07-28 PROCEDURE — 1036F TOBACCO NON-USER: CPT | Performed by: FAMILY MEDICINE

## 2020-07-28 PROCEDURE — 99213 OFFICE O/P EST LOW 20 MIN: CPT | Performed by: FAMILY MEDICINE

## 2020-07-28 PROCEDURE — 3079F DIAST BP 80-89 MM HG: CPT | Performed by: FAMILY MEDICINE

## 2020-07-28 RX ORDER — LOSARTAN POTASSIUM 100 MG/1
100 TABLET ORAL DAILY
Qty: 90 TABLET | Refills: 1 | Status: SHIPPED | OUTPATIENT
Start: 2020-07-28 | End: 2021-01-20

## 2020-07-30 PROBLEM — J18.9 COMMUNITY ACQUIRED PNEUMONIA OF LEFT UPPER LOBE OF LUNG: Status: ACTIVE | Noted: 2020-07-30

## 2020-07-30 NOTE — ASSESSMENT & PLAN NOTE
Here for follow-up after ER  Patient was treated with Levaquin  His symptoms have resolved  ER note reviewed    Patient may return to work this week

## 2020-07-30 NOTE — PROGRESS NOTES
Assessment/Plan:    Community acquired pneumonia of left upper lobe of lung  Here for follow-up after ER  Patient was treated with Levaquin  His symptoms have resolved  ER note reviewed    Patient may return to work this week  Diagnoses and all orders for this visit:    Community acquired pneumonia of left upper lobe of lung        Subjective:      Patient ID: Jonathan Stringer is a 53 y o  male  HPI    The following portions of the patient's history were reviewed and updated as appropriate: allergies, current medications, past family history, past medical history, past social history, past surgical history and problem list     Review of Systems   Constitutional: Negative for activity change, fatigue and fever  HENT: Negative for congestion, ear discharge, ear pain, postnasal drip, rhinorrhea, sinus pain, sneezing and sore throat  Eyes: Negative for photophobia, pain, discharge and redness  Respiratory: Negative for apnea, cough, shortness of breath and wheezing  Cardiovascular: Negative for chest pain and palpitations  Gastrointestinal: Negative for abdominal pain, blood in stool, constipation, diarrhea, nausea and vomiting  Endocrine: Negative for polydipsia, polyphagia and polyuria  Genitourinary: Negative for decreased urine volume, difficulty urinating, discharge, dysuria, frequency, penile pain and urgency  Musculoskeletal: Negative for arthralgias, gait problem, joint swelling and neck pain  Skin: Negative for color change and rash  Neurological: Negative for dizziness, tremors, seizures, weakness and headaches  Psychiatric/Behavioral: Negative for agitation and sleep disturbance  The patient is not nervous/anxious            Objective:      /84 (BP Location: Left arm, Patient Position: Sitting, Cuff Size: Adult)   Pulse 65   Temp 98 2 °F (36 8 °C)   Ht 5' 6 5" (1 689 m)   Wt 79 4 kg (175 lb)   BMI 27 82 kg/m²          Physical Exam   Constitutional: He is oriented to person, place, and time  He appears well-developed and well-nourished  No distress  HENT:   Head: Normocephalic  Eyes: Pupils are equal, round, and reactive to light  EOM are normal    Neck: Normal range of motion  Cardiovascular: Normal rate  Pulmonary/Chest: Effort normal and breath sounds normal  No respiratory distress  Neurological: He is alert and oriented to person, place, and time  Skin: He is not diaphoretic  Psychiatric: He has a normal mood and affect   His behavior is normal  Judgment and thought content normal

## 2020-07-30 NOTE — TELEPHONE ENCOUNTER
Priyanka Mancia called stating that [facility] sent him a partial mask and they are going to charge additional shipping and handling  Also the customer service from [place] has been inappropriate they have hung up on him and can not probably solve the problem    I made him an appt with Barbara Angeles from [facility] on 08/05/2020 at 8 am   I also contacted Vernon the Liaison from [facility] and he is going to reach out to Priyanka Mancia regarding this issue

## 2020-10-06 DIAGNOSIS — I10 ESSENTIAL HYPERTENSION: ICD-10-CM

## 2020-10-07 RX ORDER — TRIAMTERENE AND HYDROCHLOROTHIAZIDE 37.5; 25 MG/1; MG/1
TABLET ORAL
Qty: 90 TABLET | Refills: 0 | Status: SHIPPED | OUTPATIENT
Start: 2020-10-07 | End: 2021-01-06

## 2020-10-08 ENCOUNTER — OFFICE VISIT (OUTPATIENT)
Dept: PULMONOLOGY | Facility: CLINIC | Age: 50
End: 2020-10-08
Payer: COMMERCIAL

## 2020-10-08 VITALS
DIASTOLIC BLOOD PRESSURE: 84 MMHG | WEIGHT: 177 LBS | HEIGHT: 67 IN | BODY MASS INDEX: 27.78 KG/M2 | HEART RATE: 72 BPM | SYSTOLIC BLOOD PRESSURE: 124 MMHG | TEMPERATURE: 97.7 F | OXYGEN SATURATION: 98 %

## 2020-10-08 DIAGNOSIS — G47.33 OSA (OBSTRUCTIVE SLEEP APNEA): Primary | ICD-10-CM

## 2020-10-08 PROCEDURE — 99213 OFFICE O/P EST LOW 20 MIN: CPT | Performed by: PHYSICIAN ASSISTANT

## 2020-11-17 ENCOUNTER — APPOINTMENT (EMERGENCY)
Dept: RADIOLOGY | Facility: HOSPITAL | Age: 50
End: 2020-11-17
Payer: COMMERCIAL

## 2020-11-17 ENCOUNTER — APPOINTMENT (EMERGENCY)
Dept: CT IMAGING | Facility: HOSPITAL | Age: 50
End: 2020-11-17
Payer: COMMERCIAL

## 2020-11-17 ENCOUNTER — HOSPITAL ENCOUNTER (EMERGENCY)
Facility: HOSPITAL | Age: 50
Discharge: HOME | End: 2020-11-17
Attending: EMERGENCY MEDICINE | Admitting: EMERGENCY MEDICINE
Payer: COMMERCIAL

## 2020-11-17 ENCOUNTER — TELEPHONE (OUTPATIENT)
Dept: FAMILY MEDICINE CLINIC | Facility: MEDICAL CENTER | Age: 50
End: 2020-11-17

## 2020-11-17 VITALS
OXYGEN SATURATION: 97 % | HEART RATE: 66 BPM | RESPIRATION RATE: 18 BRPM | DIASTOLIC BLOOD PRESSURE: 75 MMHG | TEMPERATURE: 98.6 F | SYSTOLIC BLOOD PRESSURE: 122 MMHG

## 2020-11-17 DIAGNOSIS — E87.1 HYPONATREMIA: ICD-10-CM

## 2020-11-17 DIAGNOSIS — M54.6 ACUTE RIGHT-SIDED THORACIC BACK PAIN: ICD-10-CM

## 2020-11-17 DIAGNOSIS — K76.0 HEPATIC STEATOSIS: ICD-10-CM

## 2020-11-17 DIAGNOSIS — R74.01 TRANSAMINITIS: ICD-10-CM

## 2020-11-17 DIAGNOSIS — R10.11 RUQ PAIN: Primary | ICD-10-CM

## 2020-11-17 DIAGNOSIS — R07.89 ACUTE CHEST WALL PAIN: Primary | ICD-10-CM

## 2020-11-17 DIAGNOSIS — R07.9 CHEST PAIN, UNSPECIFIED TYPE: ICD-10-CM

## 2020-11-17 LAB
ALBUMIN SERPL BCP-MCNC: 3.8 G/DL (ref 3.5–5)
ALP SERPL-CCNC: 81 U/L (ref 46–116)
ALT SERPL W P-5'-P-CCNC: 49 U/L (ref 12–78)
ANION GAP SERPL CALCULATED.3IONS-SCNC: 11 MMOL/L (ref 4–13)
AST SERPL W P-5'-P-CCNC: 68 U/L (ref 5–45)
ATRIAL RATE: 62 BPM
BASOPHILS # BLD AUTO: 0.13 THOUSANDS/ΜL (ref 0–0.1)
BASOPHILS NFR BLD AUTO: 2 % (ref 0–1)
BILIRUB SERPL-MCNC: 0.63 MG/DL (ref 0.2–1)
BUN SERPL-MCNC: 26 MG/DL (ref 5–25)
CALCIUM SERPL-MCNC: 8.8 MG/DL (ref 8.3–10.1)
CHLORIDE SERPL-SCNC: 98 MMOL/L (ref 100–108)
CO2 SERPL-SCNC: 21 MMOL/L (ref 21–32)
CREAT SERPL-MCNC: 1.84 MG/DL (ref 0.6–1.3)
EOSINOPHIL # BLD AUTO: 0.2 THOUSAND/ΜL (ref 0–0.61)
EOSINOPHIL NFR BLD AUTO: 2 % (ref 0–6)
ERYTHROCYTE [DISTWIDTH] IN BLOOD BY AUTOMATED COUNT: 11.4 % (ref 11.6–15.1)
GFR SERPL CREATININE-BSD FRML MDRD: 42 ML/MIN/1.73SQ M
GLUCOSE SERPL-MCNC: 94 MG/DL (ref 65–140)
HCT VFR BLD AUTO: 37.3 % (ref 36.5–49.3)
HGB BLD-MCNC: 13 G/DL (ref 12–17)
IMM GRANULOCYTES # BLD AUTO: 0.11 THOUSAND/UL (ref 0–0.2)
IMM GRANULOCYTES NFR BLD AUTO: 1 % (ref 0–2)
LIPASE SERPL-CCNC: 85 U/L (ref 73–393)
LYMPHOCYTES # BLD AUTO: 2.36 THOUSANDS/ΜL (ref 0.6–4.47)
LYMPHOCYTES NFR BLD AUTO: 27 % (ref 14–44)
MCH RBC QN AUTO: 34.1 PG (ref 26.8–34.3)
MCHC RBC AUTO-ENTMCNC: 34.9 G/DL (ref 31.4–37.4)
MCV RBC AUTO: 98 FL (ref 82–98)
MONOCYTES # BLD AUTO: 1.08 THOUSAND/ΜL (ref 0.17–1.22)
MONOCYTES NFR BLD AUTO: 12 % (ref 4–12)
NEUTROPHILS # BLD AUTO: 4.84 THOUSANDS/ΜL (ref 1.85–7.62)
NEUTS SEG NFR BLD AUTO: 56 % (ref 43–75)
NRBC BLD AUTO-RTO: 0 /100 WBCS
P AXIS: 63 DEGREES
PLATELET # BLD AUTO: 169 THOUSANDS/UL (ref 149–390)
PMV BLD AUTO: 9.7 FL (ref 8.9–12.7)
POTASSIUM SERPL-SCNC: 5.3 MMOL/L (ref 3.5–5.3)
PR INTERVAL: 138 MS
PROT SERPL-MCNC: 7.5 G/DL (ref 6.4–8.2)
QRS AXIS: 54 DEGREES
QRSD INTERVAL: 92 MS
QT INTERVAL: 414 MS
QTC INTERVAL: 411 MS
RBC # BLD AUTO: 3.81 MILLION/UL (ref 3.88–5.62)
SODIUM SERPL-SCNC: 130 MMOL/L (ref 136–145)
T WAVE AXIS: 25 DEGREES
TROPONIN I SERPL-MCNC: <0.02 NG/ML
VENTRICULAR RATE: 59 BPM
WBC # BLD AUTO: 8.72 THOUSAND/UL (ref 4.31–10.16)

## 2020-11-17 PROCEDURE — 74176 CT ABD & PELVIS W/O CONTRAST: CPT

## 2020-11-17 PROCEDURE — 85025 COMPLETE CBC W/AUTO DIFF WBC: CPT | Performed by: EMERGENCY MEDICINE

## 2020-11-17 PROCEDURE — 99285 EMERGENCY DEPT VISIT HI MDM: CPT

## 2020-11-17 PROCEDURE — 36415 COLL VENOUS BLD VENIPUNCTURE: CPT

## 2020-11-17 PROCEDURE — 71046 X-RAY EXAM CHEST 2 VIEWS: CPT

## 2020-11-17 PROCEDURE — 93005 ELECTROCARDIOGRAM TRACING: CPT

## 2020-11-17 PROCEDURE — 84484 ASSAY OF TROPONIN QUANT: CPT | Performed by: EMERGENCY MEDICINE

## 2020-11-17 PROCEDURE — 80053 COMPREHEN METABOLIC PANEL: CPT | Performed by: EMERGENCY MEDICINE

## 2020-11-17 PROCEDURE — 83690 ASSAY OF LIPASE: CPT | Performed by: EMERGENCY MEDICINE

## 2020-11-17 PROCEDURE — 99284 EMERGENCY DEPT VISIT MOD MDM: CPT | Performed by: EMERGENCY MEDICINE

## 2020-11-17 PROCEDURE — 93010 ELECTROCARDIOGRAM REPORT: CPT | Performed by: INTERNAL MEDICINE

## 2020-11-17 RX ORDER — DIAZEPAM 5 MG/1
5 TABLET ORAL EVERY 8 HOURS PRN
Qty: 15 TABLET | Refills: 0 | Status: SHIPPED | OUTPATIENT
Start: 2020-11-17 | End: 2021-09-08

## 2020-12-15 ENCOUNTER — TELEPHONE (OUTPATIENT)
Dept: PULMONOLOGY | Facility: CLINIC | Age: 50
End: 2020-12-15

## 2021-01-06 DIAGNOSIS — I10 ESSENTIAL HYPERTENSION: ICD-10-CM

## 2021-01-06 RX ORDER — TRIAMTERENE AND HYDROCHLOROTHIAZIDE 37.5; 25 MG/1; MG/1
TABLET ORAL
Qty: 90 TABLET | Refills: 0 | Status: SHIPPED | OUTPATIENT
Start: 2021-01-06 | End: 2021-04-03

## 2021-01-18 DIAGNOSIS — I10 ESSENTIAL HYPERTENSION: ICD-10-CM

## 2021-01-20 RX ORDER — LOSARTAN POTASSIUM 100 MG/1
TABLET ORAL
Qty: 90 TABLET | Refills: 0 | Status: SHIPPED | OUTPATIENT
Start: 2021-01-20 | End: 2021-05-02 | Stop reason: SDUPTHER

## 2021-01-24 DIAGNOSIS — Z23 ENCOUNTER FOR IMMUNIZATION: ICD-10-CM

## 2021-02-04 ENCOUNTER — IMMUNIZATIONS (OUTPATIENT)
Dept: FAMILY MEDICINE CLINIC | Facility: HOSPITAL | Age: 51
End: 2021-02-04

## 2021-02-04 DIAGNOSIS — Z23 ENCOUNTER FOR IMMUNIZATION: Primary | ICD-10-CM

## 2021-02-04 PROCEDURE — 91301 SARS-COV-2 / COVID-19 MRNA VACCINE (MODERNA) 100 MCG: CPT

## 2021-02-04 PROCEDURE — 0011A SARS-COV-2 / COVID-19 MRNA VACCINE (MODERNA) 100 MCG: CPT

## 2021-03-03 ENCOUNTER — IMMUNIZATIONS (OUTPATIENT)
Dept: FAMILY MEDICINE CLINIC | Facility: HOSPITAL | Age: 51
End: 2021-03-03

## 2021-03-03 DIAGNOSIS — Z23 ENCOUNTER FOR IMMUNIZATION: Primary | ICD-10-CM

## 2021-03-03 PROCEDURE — 0012A SARS-COV-2 / COVID-19 MRNA VACCINE (MODERNA) 100 MCG: CPT

## 2021-03-03 PROCEDURE — 91301 SARS-COV-2 / COVID-19 MRNA VACCINE (MODERNA) 100 MCG: CPT

## 2021-04-02 DIAGNOSIS — I10 ESSENTIAL HYPERTENSION: ICD-10-CM

## 2021-04-03 RX ORDER — TRIAMTERENE AND HYDROCHLOROTHIAZIDE 37.5; 25 MG/1; MG/1
TABLET ORAL
Qty: 90 TABLET | Refills: 0 | Status: SHIPPED | OUTPATIENT
Start: 2021-04-03 | End: 2021-06-25

## 2021-04-24 ENCOUNTER — TRANSCRIBE ORDERS (OUTPATIENT)
Dept: ADMINISTRATIVE | Facility: HOSPITAL | Age: 51
End: 2021-04-24

## 2021-04-24 ENCOUNTER — APPOINTMENT (OUTPATIENT)
Dept: LAB | Facility: CLINIC | Age: 51
End: 2021-04-24

## 2021-04-24 DIAGNOSIS — Z00.8 ENCOUNTER FOR OTHER GENERAL EXAMINATION: ICD-10-CM

## 2021-04-24 DIAGNOSIS — Z00.8 ENCOUNTER FOR OTHER GENERAL EXAMINATION: Primary | ICD-10-CM

## 2021-04-24 LAB
CHOLEST SERPL-MCNC: 228 MG/DL (ref 50–200)
EST. AVERAGE GLUCOSE BLD GHB EST-MCNC: 97 MG/DL
HBA1C MFR BLD: 5 %
HDLC SERPL-MCNC: 34 MG/DL
LDLC SERPL CALC-MCNC: 152 MG/DL (ref 0–100)
NONHDLC SERPL-MCNC: 194 MG/DL
TRIGL SERPL-MCNC: 212 MG/DL

## 2021-04-24 PROCEDURE — 80061 LIPID PANEL: CPT

## 2021-04-24 PROCEDURE — 36415 COLL VENOUS BLD VENIPUNCTURE: CPT

## 2021-04-24 PROCEDURE — 83036 HEMOGLOBIN GLYCOSYLATED A1C: CPT

## 2021-04-28 DIAGNOSIS — I10 ESSENTIAL HYPERTENSION: ICD-10-CM

## 2021-05-02 DIAGNOSIS — I10 ESSENTIAL HYPERTENSION: ICD-10-CM

## 2021-05-02 RX ORDER — LOSARTAN POTASSIUM 100 MG/1
100 TABLET ORAL DAILY
Qty: 90 TABLET | Refills: 0 | Status: SHIPPED | OUTPATIENT
Start: 2021-05-02 | End: 2021-07-23

## 2021-05-02 RX ORDER — LOSARTAN POTASSIUM 100 MG/1
100 TABLET ORAL DAILY
Qty: 90 TABLET | Refills: 0 | OUTPATIENT
Start: 2021-05-02

## 2021-06-23 DIAGNOSIS — I10 ESSENTIAL HYPERTENSION: ICD-10-CM

## 2021-06-25 RX ORDER — TRIAMTERENE AND HYDROCHLOROTHIAZIDE 37.5; 25 MG/1; MG/1
TABLET ORAL
Qty: 90 TABLET | Refills: 0 | Status: SHIPPED | OUTPATIENT
Start: 2021-06-25 | End: 2021-10-04

## 2021-07-19 DIAGNOSIS — I10 ESSENTIAL HYPERTENSION: ICD-10-CM

## 2021-07-23 RX ORDER — LOSARTAN POTASSIUM 100 MG/1
TABLET ORAL
Qty: 90 TABLET | Refills: 0 | Status: SHIPPED | OUTPATIENT
Start: 2021-07-23 | End: 2021-10-25

## 2021-09-08 ENCOUNTER — OFFICE VISIT (OUTPATIENT)
Dept: FAMILY MEDICINE CLINIC | Facility: MEDICAL CENTER | Age: 51
End: 2021-09-08
Payer: COMMERCIAL

## 2021-09-08 VITALS
SYSTOLIC BLOOD PRESSURE: 130 MMHG | HEART RATE: 87 BPM | DIASTOLIC BLOOD PRESSURE: 80 MMHG | TEMPERATURE: 99 F | WEIGHT: 182.6 LBS | BODY MASS INDEX: 28.66 KG/M2 | HEIGHT: 67 IN

## 2021-09-08 DIAGNOSIS — K76.0 FATTY LIVER DISEASE, NONALCOHOLIC: ICD-10-CM

## 2021-09-08 DIAGNOSIS — I10 ESSENTIAL HYPERTENSION: ICD-10-CM

## 2021-09-08 DIAGNOSIS — E78.1 ESSENTIAL HYPERTRIGLYCERIDEMIA: ICD-10-CM

## 2021-09-08 DIAGNOSIS — K21.9 GASTROESOPHAGEAL REFLUX DISEASE WITHOUT ESOPHAGITIS: ICD-10-CM

## 2021-09-08 DIAGNOSIS — Z12.5 SCREENING FOR PROSTATE CANCER: ICD-10-CM

## 2021-09-08 DIAGNOSIS — N18.31 STAGE 3A CHRONIC KIDNEY DISEASE (HCC): Primary | ICD-10-CM

## 2021-09-08 PROCEDURE — 99214 OFFICE O/P EST MOD 30 MIN: CPT | Performed by: FAMILY MEDICINE

## 2021-09-08 PROCEDURE — 99396 PREV VISIT EST AGE 40-64: CPT | Performed by: FAMILY MEDICINE

## 2021-09-08 NOTE — PROGRESS NOTES
Assessment/Plan:    Stage 3a chronic kidney disease ([facility] [address] )  Lab Results   Component Value Date    EGFR 42 11/17/2020    EGFR 31 02/13/2020    EGFR 69 10/10/2019    CREATININE 1 84 (H) 11/17/2020    CREATININE 2 41 (H) 02/13/2020    CREATININE 1 23 10/10/2019   Patient has E GFR of 42  Two years ago it was 71  He has had him in between back about three years   Riki Stanford He knows to avoid NSAID, stay well hydrated and keep his blood pressure well controlled  Will draw a BMP next week and if it is still less than 60 I am going to advise that he sees a nephrologist     Hypertension  He takes max side and Cozaar  His blood pressure is well controlled  He will continue that  CORY (obstructive sleep apnea)  He is taking CPAP  He is doing well with it  GERD (gastroesophageal reflux disease)  He takes omeprazole  It is effective  He will continue that  Fatty liver disease, nonalcoholic  He has no pain  His transaminitis is improved the last 2 times we checked it  He knows to avoid excessive alcohol, he knows to lose some weight  Also a low carb diet  Essential hypertriglyceridemia  His cholesterol is now in the borderline range  It is better than last year  Low-fat diet, low carb diet, exercise  Will continue watching that  Problem List Items Addressed This Visit        Digestive    GERD (gastroesophageal reflux disease)     He takes omeprazole  It is effective  He will continue that  Fatty liver disease, nonalcoholic     He has no pain  His transaminitis is improved the last 2 times we checked it  He knows to avoid excessive alcohol, he knows to lose some weight  Also a low carb diet  Cardiovascular and Mediastinum    Hypertension     He takes max side and Cozaar  His blood pressure is well controlled  He will continue that              Genitourinary    Stage 3a chronic kidney disease [facility]) - Primary     Lab Results   Component Value Date    EGFR 42 11/17/2020    EGFR 31 02/13/2020    EGFR 69 10/10/2019    CREATININE 1 84 (H) 11/17/2020    CREATININE 2 41 (H) 02/13/2020    CREATININE 1 23 10/10/2019   Patient has E GFR of 42  Two years ago it was 71  He has had him GFR in between in the 50 back about three years   [facility] He knows to avoid NSAID, stay well hydrated and keep his blood pressure well controlled  Will draw a BMP next week and if it is still less than 60 I am going to advise that he sees a nephrologist          Relevant Orders    Basic metabolic panel       Other    Essential hypertriglyceridemia     His cholesterol is now in the borderline range  It is better than last year  Low-fat diet, low carb diet, exercise  Will continue watching that  Relevant Orders    Lipid Panel with Direct LDL reflex      Other Visit Diagnoses     Screening for prostate cancer        Relevant Orders    PSA, Total Screen            Subjective:      Patient ID: Conrad Ward is a 49 y o  male  This is a 15-year-old man  He is   He works for [employer]  He has a son, adult  They like to do lots of hiking  He is up-to-date with colonoscopies  He does need to do a PSA  The following portions of the patient's history were reviewed and updated as appropriate: allergies, current medications, past family history, past medical history, past social history, past surgical history and problem list     Review of Systems   Constitutional: Negative for activity change, fatigue and fever  HENT: Negative for congestion, ear discharge, ear pain, postnasal drip, rhinorrhea, sinus pain, sneezing and sore throat  Eyes: Negative for photophobia, pain, discharge and redness  Respiratory: Negative for apnea, cough, shortness of breath and wheezing  Cardiovascular: Negative for chest pain and palpitations  Gastrointestinal: Negative for abdominal pain, blood in stool, constipation, diarrhea, nausea and vomiting     Endocrine: Negative for polydipsia, polyphagia and polyuria  Genitourinary: Negative for decreased urine volume, difficulty urinating, discharge, dysuria, frequency, penile pain and urgency  Musculoskeletal: Negative for arthralgias, gait problem, joint swelling and neck pain  Skin: Negative for color change and rash  Neurological: Negative for dizziness, tremors, seizures, weakness and headaches  Psychiatric/Behavioral: Negative for agitation and sleep disturbance  The patient is not nervous/anxious  Objective:      /80 (BP Location: Left arm, Patient Position: Sitting, Cuff Size: Adult)   Pulse 87   Temp 99 °F (37 2 °C)   Ht 5' 6 5" (1 689 m)   Wt 82 8 kg (182 lb 9 6 oz)   BMI 29 03 kg/m²          Physical Exam  Vitals and nursing note reviewed  Constitutional:       Appearance: Normal appearance  He is well-developed  HENT:      Head: Normocephalic  Right Ear: External ear normal       Left Ear: External ear normal       Nose: Nose normal    Eyes:      General: Lids are normal       Conjunctiva/sclera: Conjunctivae normal       Pupils: Pupils are equal, round, and reactive to light  Neck:      Thyroid: No thyromegaly  Vascular: No carotid bruit  Cardiovascular:      Rate and Rhythm: Normal rate and regular rhythm  Pulses: Normal pulses  Heart sounds: Normal heart sounds, S1 normal and S2 normal  No murmur heard  Pulmonary:      Effort: Pulmonary effort is normal  No respiratory distress  Breath sounds: Normal breath sounds  No wheezing or rales  Abdominal:      General: Bowel sounds are normal       Palpations: Abdomen is soft  There is no mass  Tenderness: There is no abdominal tenderness  Musculoskeletal:         General: Normal range of motion  Cervical back: Normal range of motion and neck supple  Lymphadenopathy:      Cervical: No cervical adenopathy  Skin:     General: Skin is warm and dry  Coloration: Skin is not pale  Findings: No rash     Neurological: Mental Status: He is alert and oriented to person, place, and time  Cranial Nerves: No cranial nerve deficit  Sensory: No sensory deficit  Deep Tendon Reflexes: Reflexes are normal and symmetric  Psychiatric:         Behavior: Behavior normal  Behavior is cooperative  Thought Content:  Thought content normal          Judgment: Judgment normal

## 2021-09-08 NOTE — ASSESSMENT & PLAN NOTE
Lab Results   Component Value Date    EGFR 42 11/17/2020    EGFR 31 02/13/2020    EGFR 69 10/10/2019    CREATININE 1 84 (H) 11/17/2020    CREATININE 2 41 (H) 02/13/2020    CREATININE 1 23 10/10/2019   Patient has E GFR of 42  Two years ago it was 71  He has had him GFR in between in the 50 back about three years   Syble Reji He knows to avoid NSAID, stay well hydrated and keep his blood pressure well controlled    Will draw a BMP next week and if it is still less than 60 I am going to advise that he sees a nephrologist

## 2021-09-08 NOTE — ASSESSMENT & PLAN NOTE
His cholesterol is now in the borderline range  It is better than last year  Low-fat diet, low carb diet, exercise  Will continue watching that

## 2021-09-08 NOTE — ASSESSMENT & PLAN NOTE
He has no pain  His transaminitis is improved the last 2 times we checked it  He knows to avoid excessive alcohol, he knows to lose some weight  Also a low carb diet

## 2021-09-25 ENCOUNTER — APPOINTMENT (OUTPATIENT)
Dept: LAB | Facility: CLINIC | Age: 51
End: 2021-09-25
Payer: COMMERCIAL

## 2021-09-25 DIAGNOSIS — N18.31 STAGE 3A CHRONIC KIDNEY DISEASE (HCC): ICD-10-CM

## 2021-09-25 LAB
ANION GAP SERPL CALCULATED.3IONS-SCNC: 3 MMOL/L (ref 4–13)
BUN SERPL-MCNC: 17 MG/DL (ref 5–25)
CALCIUM SERPL-MCNC: 9.1 MG/DL (ref 8.3–10.1)
CHLORIDE SERPL-SCNC: 105 MMOL/L (ref 100–108)
CO2 SERPL-SCNC: 28 MMOL/L (ref 21–32)
CREAT SERPL-MCNC: 1.42 MG/DL (ref 0.6–1.3)
GFR SERPL CREATININE-BSD FRML MDRD: 57 ML/MIN/1.73SQ M
GLUCOSE P FAST SERPL-MCNC: 104 MG/DL (ref 65–99)
POTASSIUM SERPL-SCNC: 4.4 MMOL/L (ref 3.5–5.3)
SODIUM SERPL-SCNC: 136 MMOL/L (ref 136–145)

## 2021-09-25 PROCEDURE — 36415 COLL VENOUS BLD VENIPUNCTURE: CPT

## 2021-09-25 PROCEDURE — 80048 BASIC METABOLIC PNL TOTAL CA: CPT

## 2021-09-26 DIAGNOSIS — I10 ESSENTIAL HYPERTENSION: ICD-10-CM

## 2021-10-04 RX ORDER — TRIAMTERENE AND HYDROCHLOROTHIAZIDE 37.5; 25 MG/1; MG/1
TABLET ORAL
Qty: 90 TABLET | Refills: 0 | Status: SHIPPED | OUTPATIENT
Start: 2021-10-04 | End: 2021-12-28

## 2021-10-24 DIAGNOSIS — I10 ESSENTIAL HYPERTENSION: ICD-10-CM

## 2021-10-25 ENCOUNTER — OFFICE VISIT (OUTPATIENT)
Dept: URGENT CARE | Facility: CLINIC | Age: 51
End: 2021-10-25
Payer: COMMERCIAL

## 2021-10-25 VITALS
HEIGHT: 67 IN | TEMPERATURE: 97.7 F | WEIGHT: 178 LBS | BODY MASS INDEX: 27.94 KG/M2 | HEART RATE: 95 BPM | RESPIRATION RATE: 18 BRPM | OXYGEN SATURATION: 100 %

## 2021-10-25 DIAGNOSIS — R50.9 FEVER, UNSPECIFIED FEVER CAUSE: Primary | ICD-10-CM

## 2021-10-25 PROCEDURE — G0382 LEV 3 HOSP TYPE B ED VISIT: HCPCS | Performed by: PHYSICIAN ASSISTANT

## 2021-10-25 PROCEDURE — 0241U HB NFCT DS VIR RESP RNA 4 TRGT: CPT | Performed by: PHYSICIAN ASSISTANT

## 2021-10-25 RX ORDER — LOSARTAN POTASSIUM 100 MG/1
TABLET ORAL
Qty: 90 TABLET | Refills: 0 | Status: SHIPPED | OUTPATIENT
Start: 2021-10-25 | End: 2022-01-18 | Stop reason: SDUPTHER

## 2021-10-25 RX ORDER — ONDANSETRON 4 MG/1
4 TABLET, FILM COATED ORAL EVERY 8 HOURS PRN
Qty: 20 TABLET | Refills: 0 | Status: SHIPPED | OUTPATIENT
Start: 2021-10-25 | End: 2022-03-28

## 2021-10-26 LAB
FLUAV RNA RESP QL NAA+PROBE: NEGATIVE
FLUBV RNA RESP QL NAA+PROBE: NEGATIVE
RSV RNA RESP QL NAA+PROBE: NEGATIVE
SARS-COV-2 RNA RESP QL NAA+PROBE: NEGATIVE

## 2021-11-10 ENCOUNTER — IMMUNIZATIONS (OUTPATIENT)
Dept: FAMILY MEDICINE CLINIC | Facility: HOSPITAL | Age: 51
End: 2021-11-10

## 2021-11-10 DIAGNOSIS — Z23 ENCOUNTER FOR IMMUNIZATION: Primary | ICD-10-CM

## 2021-11-10 PROCEDURE — 91306 COVID-19 MODERNA VACC 0.25 ML BOOSTER: CPT

## 2021-11-10 PROCEDURE — 0013A COVID-19 MODERNA VACC 0.25 ML BOOSTER: CPT

## 2021-12-27 DIAGNOSIS — I10 ESSENTIAL HYPERTENSION: ICD-10-CM

## 2021-12-28 RX ORDER — TRIAMTERENE AND HYDROCHLOROTHIAZIDE 37.5; 25 MG/1; MG/1
TABLET ORAL
Qty: 90 TABLET | Refills: 0 | Status: SHIPPED | OUTPATIENT
Start: 2021-12-28 | End: 2022-03-30 | Stop reason: SDUPTHER

## 2022-01-07 ENCOUNTER — TELEPHONE (OUTPATIENT)
Dept: FAMILY MEDICINE CLINIC | Facility: MEDICAL CENTER | Age: 52
End: 2022-01-07

## 2022-01-07 DIAGNOSIS — R09.81 NASAL CONGESTION: ICD-10-CM

## 2022-01-07 DIAGNOSIS — J02.9 SORE THROAT: Primary | ICD-10-CM

## 2022-01-07 NOTE — TELEPHONE ENCOUNTER
Pt c/o muted taste, sore throat, congestion, fever  Pt is vaccinated  Tent times and precautions discussed   Please, order test

## 2022-01-08 PROCEDURE — U0003 INFECTIOUS AGENT DETECTION BY NUCLEIC ACID (DNA OR RNA); SEVERE ACUTE RESPIRATORY SYNDROME CORONAVIRUS 2 (SARS-COV-2) (CORONAVIRUS DISEASE [COVID-19]), AMPLIFIED PROBE TECHNIQUE, MAKING USE OF HIGH THROUGHPUT TECHNOLOGIES AS DESCRIBED BY CMS-2020-01-R: HCPCS | Performed by: FAMILY MEDICINE

## 2022-01-08 PROCEDURE — U0005 INFEC AGEN DETEC AMPLI PROBE: HCPCS | Performed by: FAMILY MEDICINE

## 2022-01-18 DIAGNOSIS — I10 ESSENTIAL HYPERTENSION: ICD-10-CM

## 2022-01-19 RX ORDER — LOSARTAN POTASSIUM 100 MG/1
100 TABLET ORAL DAILY
Qty: 90 TABLET | Refills: 0 | Status: SHIPPED | OUTPATIENT
Start: 2022-01-19 | End: 2022-04-20 | Stop reason: SDUPTHER

## 2022-02-02 ENCOUNTER — OFFICE VISIT (OUTPATIENT)
Dept: PULMONOLOGY | Facility: CLINIC | Age: 52
End: 2022-02-02
Payer: COMMERCIAL

## 2022-02-02 VITALS
TEMPERATURE: 97.9 F | OXYGEN SATURATION: 95 % | HEART RATE: 80 BPM | BODY MASS INDEX: 27.94 KG/M2 | HEIGHT: 67 IN | DIASTOLIC BLOOD PRESSURE: 84 MMHG | SYSTOLIC BLOOD PRESSURE: 140 MMHG | WEIGHT: 178 LBS

## 2022-02-02 DIAGNOSIS — G47.33 OSA (OBSTRUCTIVE SLEEP APNEA): Primary | ICD-10-CM

## 2022-02-02 PROCEDURE — 99213 OFFICE O/P EST LOW 20 MIN: CPT | Performed by: PHYSICIAN ASSISTANT

## 2022-02-03 NOTE — PROGRESS NOTES
Assessment/Plan:   Diagnoses and all orders for this visit:    CORY (obstructive sleep apnea)    Patient is here today for follow-up  Last seen in October 2020 for his sleep apnea  He continues to do well with the CPAP, feels he sleeps well during the night  He does skip some nights here and there as he does not always like using the CPAP  Generally feels well rested during the day  Reviewed CPAP compliance which shows he is compliant with the CPAP most nights, residual AHI is 7 9 which is down significantly from his sleep study  He will continue with the CPAP at the current settings  He can follow-up with us in 1 year sooner if necessary  Return in about 1 year (around 2/2/2023)  All questions are answered to the patient's satisfaction and understanding  He verbalizes understanding  He is encouraged to call with any further questions or concerns  Portions of the record may have been created with voice recognition software  Occasional wrong word or "sound a like" substitutions may have occurred due to the inherent limitations of voice recognition software  Read the chart carefully and recognize, using context, where substitutions have occurred  Electronically Signed by Jordana Benoit PA-C    ______________________________________________________________________    Chief Complaint:   Chief Complaint   Patient presents with    Follow-up    Sleep Apnea       Patient ID: Donna Amador is a 49 y o  y o  male has a past medical history of Chronic kidney disease, GERD (gastroesophageal reflux disease), Hypertension, CORY (obstructive sleep apnea), and Sleep apnea  2/2/2022  Patient presents today for follow-up visit  Patient is a 56-year-old male past medical history hypertension, GERD, severe sleep apnea on CPAP  He is here today for follow-up, last seen in October 2020  He is overall doing well, using his CPAP just about every night    He sleeps well during the night and feels well rested during the day  No issues with the CPAP mask or pressure  primary symptoms  Associated symptoms include chest pain  Pertinent negatives include no fever, headaches, myalgias or sore throat  Review of Systems   Constitutional: Negative  Negative for appetite change and fever  HENT: Positive for postnasal drip  Negative for ear pain, rhinorrhea, sneezing, sore throat and trouble swallowing  Respiratory: Negative  Cardiovascular: Positive for chest pain  Gastrointestinal: Negative  Genitourinary: Negative  Musculoskeletal: Negative  Negative for myalgias  Skin: Negative  Allergic/Immunologic: Negative  Neurological: Negative  Negative for headaches  Psychiatric/Behavioral: Negative  Smoking history: He reports that he quit smoking about 2 years ago  His smoking use included cigarettes  He started smoking about 28 years ago  He has a 25 00 pack-year smoking history  He has never used smokeless tobacco     The following portions of the patient's history were reviewed and updated as appropriate: allergies, current medications, past family history, past medical history, past social history, past surgical history and problem list     Immunization History   Administered Date(s) Administered    COVID-19 MODERNA VACC 0 25 ML IM BOOSTER 11/10/2021    COVID-19 MODERNA VACC 0 5 ML IM 02/04/2021, 03/03/2021    INFLUENZA 10/01/2014, 10/14/2015, 10/05/2016, 09/19/2017, 09/25/2018    Influenza, injectable, quadrivalent, preservative free 0 5 mL 09/20/2019    Influenza, seasonal, injectable 10/28/2013     Current Outpatient Medications   Medication Sig Dispense Refill    losartan (COZAAR) 100 MG tablet Take 1 tablet (100 mg total) by mouth daily 90 tablet 0    omeprazole (PriLOSEC) 20 mg delayed release capsule Take 1 capsule by mouth daily      triamterene-hydrochlorothiazide (MAXZIDE-25) 37 5-25 mg per tablet TAKE 1 TABLET BY MOUTH DAILY AS DIRECTED   90 tablet 0    ondansetron (ZOFRAN) 4 mg tablet Take 1 tablet (4 mg total) by mouth every 8 (eight) hours as needed for nausea or vomiting 20 tablet 0     No current facility-administered medications for this visit  Allergies: Sulfa antibiotics    Objective:  Vitals:    02/02/22 1454   BP: 140/84   Pulse: 80   Temp: 97 9 °F (36 6 °C)   SpO2: 95%   Weight: 80 7 kg (178 lb)   Height: 5' 7" (1 702 m)   Oxygen Therapy  SpO2: 95 %    Wt Readings from Last 3 Encounters:   02/02/22 80 7 kg (178 lb)   10/25/21 80 7 kg (178 lb)   09/08/21 82 8 kg (182 lb 9 6 oz)     Body mass index is 27 88 kg/m²  Physical Exam  Vitals reviewed  Constitutional:       General: He is not in acute distress  Appearance: Normal appearance  He is not ill-appearing  HENT:      Head: Normocephalic and atraumatic  Mouth/Throat:      Pharynx: Oropharynx is clear  Eyes:      Conjunctiva/sclera: Conjunctivae normal    Cardiovascular:      Rate and Rhythm: Normal rate and regular rhythm  Pulmonary:      Effort: Pulmonary effort is normal  No respiratory distress  Breath sounds: Normal breath sounds  No decreased breath sounds, wheezing, rhonchi or rales  Abdominal:      General: Abdomen is flat  There is no distension  Palpations: Abdomen is soft  Musculoskeletal:         General: Normal range of motion  Cervical back: Normal range of motion  Right lower leg: No edema  Left lower leg: No edema  Skin:     General: Skin is warm and dry  Neurological:      Mental Status: He is alert and oriented to person, place, and time     Psychiatric:         Mood and Affect: Mood normal          Behavior: Behavior normal          Lab Review:   Lab Results   Component Value Date     (L) 01/08/2016    K 4 4 09/25/2021    K 4 4 01/08/2016     09/25/2021    CL 99 (L) 01/08/2016    CO2 28 09/25/2021    CO2 26 01/08/2016    BUN 17 09/25/2021    BUN 14 01/08/2016    CREATININE 1 42 (H) 09/25/2021    CREATININE 1 23 01/08/2016 GLUCOSE 90 01/08/2016    CALCIUM 9 1 09/25/2021    CALCIUM 9 0 01/08/2016     Lab Results   Component Value Date    WBC 8 72 11/17/2020    WBC 10 06 01/08/2016    HGB 13 0 11/17/2020    HGB 15 7 01/08/2016    HCT 37 3 11/17/2020    HCT 45 1 01/08/2016    MCV 98 11/17/2020    MCV 97 01/08/2016     11/17/2020     01/08/2016       Diagnostics:  I have personally reviewed pertinent reports  Reviewed CPAP compliance  Office Spirometry Results:     ESS:    No results found    Answers for HPI/ROS submitted by the patient on 1/31/2022  Chronicity: chronic  When did you first notice your symptoms?: more than 1 year ago  How often do your symptoms occur?: every several days  Since you first noticed this problem, how has it changed?: waxing and waning  Do you have shortness of breath that occurs with effort or exertion?: No  Do you have ear congestion?: No  Do you have heartburn?: No  Do you have fatigue?: Yes  Do you have nasal congestion?: Yes  Do you have shortness of breath when lying flat?: No  Do you have shortness of breath when you wake up?: No  Do you have sweats?: No  Have you experienced weight loss?: No  Which of the following makes your symptoms worse?: lying down, strenuous activity

## 2022-02-28 ENCOUNTER — TELEPHONE (OUTPATIENT)
Dept: FAMILY MEDICINE CLINIC | Facility: MEDICAL CENTER | Age: 52
End: 2022-02-28

## 2022-02-28 DIAGNOSIS — Z13.1 ENCOUNTER FOR SCREENING FOR DIABETES MELLITUS: Primary | ICD-10-CM

## 2022-02-28 NOTE — TELEPHONE ENCOUNTER
----- Message from [address] [facility] sent at 2/28/2022 10:17 AM EST -----  Regarding: Bloodwork  Can you please add an A1C to my upcoming bloodwork  I will need one for the [facility] program and don't want to have to get blood drawn twice   Thanks

## 2022-03-18 ENCOUNTER — APPOINTMENT (OUTPATIENT)
Dept: LAB | Facility: CLINIC | Age: 52
End: 2022-03-18
Payer: COMMERCIAL

## 2022-03-18 DIAGNOSIS — Z12.5 SCREENING FOR PROSTATE CANCER: ICD-10-CM

## 2022-03-18 DIAGNOSIS — Z13.1 ENCOUNTER FOR SCREENING FOR DIABETES MELLITUS: ICD-10-CM

## 2022-03-18 DIAGNOSIS — E78.1 ESSENTIAL HYPERTRIGLYCERIDEMIA: ICD-10-CM

## 2022-03-18 LAB
CHOLEST SERPL-MCNC: 253 MG/DL
EST. AVERAGE GLUCOSE BLD GHB EST-MCNC: 105 MG/DL
HBA1C MFR BLD: 5.3 %
HDLC SERPL-MCNC: 43 MG/DL
LDLC SERPL CALC-MCNC: 161 MG/DL (ref 0–100)
PSA SERPL-MCNC: 0.8 NG/ML (ref 0–4)
TRIGL SERPL-MCNC: 247 MG/DL

## 2022-03-18 PROCEDURE — G0103 PSA SCREENING: HCPCS

## 2022-03-18 PROCEDURE — 83036 HEMOGLOBIN GLYCOSYLATED A1C: CPT

## 2022-03-18 PROCEDURE — 80061 LIPID PANEL: CPT

## 2022-03-18 PROCEDURE — 36415 COLL VENOUS BLD VENIPUNCTURE: CPT

## 2022-03-28 ENCOUNTER — OFFICE VISIT (OUTPATIENT)
Dept: FAMILY MEDICINE CLINIC | Facility: MEDICAL CENTER | Age: 52
End: 2022-03-28
Payer: COMMERCIAL

## 2022-03-28 VITALS
DIASTOLIC BLOOD PRESSURE: 82 MMHG | SYSTOLIC BLOOD PRESSURE: 126 MMHG | BODY MASS INDEX: 28.41 KG/M2 | HEIGHT: 67 IN | OXYGEN SATURATION: 100 % | HEART RATE: 70 BPM | WEIGHT: 181 LBS | TEMPERATURE: 98.7 F

## 2022-03-28 DIAGNOSIS — N18.31 STAGE 3A CHRONIC KIDNEY DISEASE (HCC): Primary | ICD-10-CM

## 2022-03-28 DIAGNOSIS — E78.2 MIXED HYPERLIPIDEMIA: ICD-10-CM

## 2022-03-28 DIAGNOSIS — I10 PRIMARY HYPERTENSION: ICD-10-CM

## 2022-03-28 DIAGNOSIS — K21.9 GASTROESOPHAGEAL REFLUX DISEASE WITHOUT ESOPHAGITIS: ICD-10-CM

## 2022-03-28 PROBLEM — J18.9 COMMUNITY ACQUIRED PNEUMONIA OF LEFT UPPER LOBE OF LUNG: Status: RESOLVED | Noted: 2020-07-30 | Resolved: 2022-03-28

## 2022-03-28 PROCEDURE — 99214 OFFICE O/P EST MOD 30 MIN: CPT | Performed by: FAMILY MEDICINE

## 2022-03-28 RX ORDER — ROSUVASTATIN CALCIUM 5 MG/1
5 TABLET, COATED ORAL DAILY
Qty: 90 TABLET | Refills: 3 | Status: SHIPPED | OUTPATIENT
Start: 2022-03-28

## 2022-03-28 NOTE — ASSESSMENT & PLAN NOTE
He has high cholesterol and high triglycerides  His a LDL is consistently over 160  He has been resistant to treating that with the statin  He now agrees to taking rosuvastatin 5 mg  Will recheck his lipids and ALT and AST  In six weeks  We will have him back in a couple months for recheck  His ALT and AST have had modest elevations, secondary to excessive beer drinking  Needs to back on the alcohol

## 2022-03-28 NOTE — PROGRESS NOTES
Assessment/Plan:    Hypertension  Blood pressure is well controlled with losartan and triamterene hydrochlorothiazide  Continue those medications  I have recommend that he gets more exercise, weight loss and low-salt diet  Stage 3a chronic kidney disease [facility])  Lab Results   Component Value Date    EGFR 57 09/25/2021    EGFR 42 11/17/2020    EGFR 31 02/13/2020    CREATININE 1 42 (H) 09/25/2021    CREATININE 1 84 (H) 11/17/2020    CREATININE 2 41 (H) 02/13/2020     He has responded to drinking more water  It still stage IIIA  He needs to keep drinking more water, needs to drink less beer and exercise and try to lose weight  We will continue watching that closely  Recheck another BMP in 6-8 weeks  Mixed hyperlipidemia  He has high cholesterol and high triglycerides  His a LDL is consistently over 160  He has been resistant to treating that with the statin  He now agrees to taking rosuvastatin 5 mg  Will recheck his lipids and ALT and AST  In six weeks  We will have him back in a couple months for recheck  His ALT and AST have had modest elevations, secondary to excessive beer drinking  Needs to back on the alcohol  GERD (gastroesophageal reflux disease)  He is taking omeprazole 20 mg q day  It is working well  He is not having any breakthrough symptoms  Continue omeprazole  Problem List Items Addressed This Visit        Digestive    GERD (gastroesophageal reflux disease)     He is taking omeprazole 20 mg q day  It is working well  He is not having any breakthrough symptoms  Continue omeprazole  Cardiovascular and Mediastinum    Hypertension     Blood pressure is well controlled with losartan and triamterene hydrochlorothiazide  Continue those medications  I have recommend that he gets more exercise, weight loss and low-salt diet              Genitourinary    Stage 3a chronic kidney disease ([facility] [address] ) - Primary     Lab Results   Component Value Date EGFR 57 09/25/2021    EGFR 42 11/17/2020    EGFR 31 02/13/2020    CREATININE 1 42 (H) 09/25/2021    CREATININE 1 84 (H) 11/17/2020    CREATININE 2 41 (H) 02/13/2020     He has responded to drinking more water  It still stage IIIA  He needs to keep drinking more water, needs to drink less beer and exercise and try to lose weight  We will continue watching that closely  Recheck another BMP in 6-8 weeks  Relevant Orders    Basic metabolic panel       Other    Mixed hyperlipidemia     He has high cholesterol and high triglycerides  His a LDL is consistently over 160  He has been resistant to treating that with the statin  He now agrees to taking rosuvastatin 5 mg  Will recheck his lipids and ALT and AST  In six weeks  We will have him back in a couple months for recheck  His ALT and AST have had modest elevations, secondary to excessive beer drinking  Needs to back on the alcohol  Relevant Medications    rosuvastatin (CRESTOR) 5 mg tablet    Other Relevant Orders    Lipid Panel with Direct LDL reflex    ALT    AST            Subjective:      Patient ID: Ana Lopes is a 49 y o  male  The following portions of the patient's history were reviewed and updated as appropriate: allergies, current medications, past family history, past medical history, past social history, past surgical history and problem list     Review of Systems   Constitutional: Negative  HENT: Negative  Eyes: Negative  Respiratory: Negative  Cardiovascular: Negative  Gastrointestinal: Negative  Genitourinary: Negative  Musculoskeletal: Negative  Neurological: Negative  Psychiatric/Behavioral: Negative            Objective:      /82 (BP Location: Left arm, Patient Position: Sitting, Cuff Size: Adult)   Pulse 70   Temp 98 7 °F (37 1 °C)   Ht 5' 7" (1 702 m)   Wt 82 1 kg (181 lb)   SpO2 100%   BMI 28 35 kg/m²          Physical Exam  Constitutional:       General: He is not in acute distress  Appearance: He is well-developed  He is not diaphoretic  HENT:      Head: Normocephalic  Nose: Nose normal       Mouth/Throat:      Mouth: Mucous membranes are moist    Eyes:      Pupils: Pupils are equal, round, and reactive to light  Cardiovascular:      Rate and Rhythm: Normal rate  Pulses: Normal pulses  Pulmonary:      Effort: Pulmonary effort is normal  No respiratory distress  Musculoskeletal:      Cervical back: Normal range of motion  Skin:     General: Skin is warm and dry  Neurological:      Mental Status: He is alert and oriented to person, place, and time  Psychiatric:         Behavior: Behavior normal          Thought Content:  Thought content normal          Judgment: Judgment normal

## 2022-03-28 NOTE — ASSESSMENT & PLAN NOTE
Blood pressure is well controlled with losartan and triamterene hydrochlorothiazide  Continue those medications  I have recommend that he gets more exercise, weight loss and low-salt diet

## 2022-03-28 NOTE — ASSESSMENT & PLAN NOTE
He is taking omeprazole 20 mg q day  It is working well  He is not having any breakthrough symptoms  Continue omeprazole

## 2022-03-28 NOTE — ASSESSMENT & PLAN NOTE
Lab Results   Component Value Date    EGFR 57 09/25/2021    EGFR 42 11/17/2020    EGFR 31 02/13/2020    CREATININE 1 42 (H) 09/25/2021    CREATININE 1 84 (H) 11/17/2020    CREATININE 2 41 (H) 02/13/2020     He has responded to drinking more water  It still stage IIIA  He needs to keep drinking more water, needs to drink less beer and exercise and try to lose weight  We will continue watching that closely  Recheck another BMP in 6-8 weeks

## 2022-03-30 DIAGNOSIS — I10 ESSENTIAL HYPERTENSION: ICD-10-CM

## 2022-03-30 RX ORDER — TRIAMTERENE AND HYDROCHLOROTHIAZIDE 37.5; 25 MG/1; MG/1
TABLET ORAL
Qty: 90 TABLET | Refills: 0 | Status: SHIPPED | OUTPATIENT
Start: 2022-03-30 | End: 2022-06-07

## 2022-03-30 RX ORDER — TRIAMTERENE AND HYDROCHLOROTHIAZIDE 37.5; 25 MG/1; MG/1
1 TABLET ORAL DAILY
Qty: 90 TABLET | Refills: 0 | Status: SHIPPED | OUTPATIENT
Start: 2022-03-30 | End: 2022-06-27

## 2022-04-20 DIAGNOSIS — I10 ESSENTIAL HYPERTENSION: ICD-10-CM

## 2022-04-20 RX ORDER — LOSARTAN POTASSIUM 100 MG/1
100 TABLET ORAL DAILY
Qty: 90 TABLET | Refills: 0 | Status: SHIPPED | OUTPATIENT
Start: 2022-04-20 | End: 2022-07-24 | Stop reason: SDUPTHER

## 2022-06-03 ENCOUNTER — APPOINTMENT (OUTPATIENT)
Dept: LAB | Facility: CLINIC | Age: 52
End: 2022-06-03
Payer: COMMERCIAL

## 2022-06-03 DIAGNOSIS — N18.31 STAGE 3A CHRONIC KIDNEY DISEASE (HCC): ICD-10-CM

## 2022-06-03 DIAGNOSIS — E78.2 MIXED HYPERLIPIDEMIA: ICD-10-CM

## 2022-06-03 LAB
ALT SERPL W P-5'-P-CCNC: 51 U/L (ref 12–78)
ANION GAP SERPL CALCULATED.3IONS-SCNC: 8 MMOL/L (ref 4–13)
AST SERPL W P-5'-P-CCNC: 32 U/L (ref 5–45)
BUN SERPL-MCNC: 19 MG/DL (ref 5–25)
CALCIUM SERPL-MCNC: 9.7 MG/DL (ref 8.3–10.1)
CHLORIDE SERPL-SCNC: 104 MMOL/L (ref 100–108)
CHOLEST SERPL-MCNC: 207 MG/DL
CO2 SERPL-SCNC: 26 MMOL/L (ref 21–32)
CREAT SERPL-MCNC: 1.36 MG/DL (ref 0.6–1.3)
GFR SERPL CREATININE-BSD FRML MDRD: 59 ML/MIN/1.73SQ M
GLUCOSE P FAST SERPL-MCNC: 116 MG/DL (ref 65–99)
HDLC SERPL-MCNC: 45 MG/DL
LDLC SERPL CALC-MCNC: 120 MG/DL (ref 0–100)
POTASSIUM SERPL-SCNC: 4.6 MMOL/L (ref 3.5–5.3)
SODIUM SERPL-SCNC: 138 MMOL/L (ref 136–145)
TRIGL SERPL-MCNC: 209 MG/DL

## 2022-06-03 PROCEDURE — 80061 LIPID PANEL: CPT

## 2022-06-03 PROCEDURE — 36415 COLL VENOUS BLD VENIPUNCTURE: CPT

## 2022-06-03 PROCEDURE — 84460 ALANINE AMINO (ALT) (SGPT): CPT

## 2022-06-03 PROCEDURE — 84450 TRANSFERASE (AST) (SGOT): CPT

## 2022-06-03 PROCEDURE — 80048 BASIC METABOLIC PNL TOTAL CA: CPT

## 2022-06-07 ENCOUNTER — OFFICE VISIT (OUTPATIENT)
Dept: FAMILY MEDICINE CLINIC | Facility: MEDICAL CENTER | Age: 52
End: 2022-06-07
Payer: COMMERCIAL

## 2022-06-07 VITALS
HEART RATE: 87 BPM | HEIGHT: 67 IN | BODY MASS INDEX: 27.78 KG/M2 | TEMPERATURE: 98.2 F | WEIGHT: 177 LBS | OXYGEN SATURATION: 98 % | DIASTOLIC BLOOD PRESSURE: 80 MMHG | SYSTOLIC BLOOD PRESSURE: 120 MMHG

## 2022-06-07 DIAGNOSIS — K21.9 GASTROESOPHAGEAL REFLUX DISEASE WITHOUT ESOPHAGITIS: ICD-10-CM

## 2022-06-07 DIAGNOSIS — N18.31 STAGE 3A CHRONIC KIDNEY DISEASE (HCC): ICD-10-CM

## 2022-06-07 DIAGNOSIS — H11.32 CONJUNCTIVAL HEMORRHAGE OF LEFT EYE: Primary | ICD-10-CM

## 2022-06-07 DIAGNOSIS — I10 PRIMARY HYPERTENSION: ICD-10-CM

## 2022-06-07 DIAGNOSIS — Z12.11 SCREENING FOR MALIGNANT NEOPLASM OF COLON: ICD-10-CM

## 2022-06-07 PROCEDURE — 99214 OFFICE O/P EST MOD 30 MIN: CPT | Performed by: FAMILY MEDICINE

## 2022-06-10 NOTE — ASSESSMENT & PLAN NOTE
He is taking omeprazole and it is effective for GERD  Absolutely no symptoms  Continue omeprazole, continue lifestyle changes

## 2022-06-10 NOTE — ASSESSMENT & PLAN NOTE
Lab Results   Component Value Date    EGFR 59 06/03/2022    EGFR 57 09/25/2021    EGFR 42 11/17/2020    CREATININE 1 36 (H) 06/03/2022    CREATININE 1 42 (H) 09/25/2021    CREATININE 1 84 (H) 11/17/2020     He slowly improving      He has kept his blood pressure down, he keeps himself well hydrated, and avoids NSAIDs

## 2022-06-10 NOTE — ASSESSMENT & PLAN NOTE
Today's blood pressure is 120/80  He is taking Maxzide and losartan  He is tolerating that well  I started the losartan 100 mg and started max side last time  Continue Maxzide, continue losartan, continue attempts to lose weight, exercise and low-salt diet

## 2022-06-10 NOTE — ASSESSMENT & PLAN NOTE
We started rosuvastatin last time  His LDL has dropped from 161-123  Continue rosuvastatin 5 mg, continue low-fat diet, continue attempts to lose weight and exercise

## 2022-06-10 NOTE — PROGRESS NOTES
Assessment/Plan:    Hypertension  Today's blood pressure is 120/80  He is taking Maxzide and losartan  He is tolerating that well  I started the losartan 100 mg and started max side last time  Continue Maxzide, continue losartan, continue attempts to lose weight, exercise and low-salt diet  Stage 3a chronic kidney disease [facility])  Lab Results   Component Value Date    EGFR 59 06/03/2022    EGFR 57 09/25/2021    EGFR 42 11/17/2020    CREATININE 1 36 (H) 06/03/2022    CREATININE 1 42 (H) 09/25/2021    CREATININE 1 84 (H) 11/17/2020     He slowly improving  He has kept his blood pressure down, he keeps himself well hydrated, and avoids NSAIDs    GERD (gastroesophageal reflux disease)  He is taking omeprazole and it is effective for GERD  Absolutely no symptoms  Continue omeprazole, continue lifestyle changes  Mixed hyperlipidemia  We started rosuvastatin last time  His LDL has dropped from 161-123  Continue rosuvastatin 5 mg, continue low-fat diet, continue attempts to lose weight and exercise  Diagnoses and all orders for this visit:    Conjunctival hemorrhage of left eye    Screening for malignant neoplasm of colon  -     Ambulatory referral to Gastroenterology; Future    Stage 3a chronic kidney disease (HCC)  -     Basic metabolic panel; Future    Primary hypertension    Gastroesophageal reflux disease without esophagitis          Subjective:      Patient ID: Stanton Roland is a 47 y o  male  The following portions of the patient's history were reviewed and updated as appropriate: allergies, current medications, past family history, past medical history, past social history, past surgical history and problem list     Review of Systems   Constitutional: Negative for activity change, fatigue and fever  HENT: Negative for congestion, ear discharge, ear pain, postnasal drip, rhinorrhea, sinus pain, sneezing and sore throat      Eyes: Negative for photophobia, pain, discharge and redness  Respiratory: Negative for apnea, cough, shortness of breath and wheezing  Cardiovascular: Negative for chest pain and palpitations  Gastrointestinal: Negative for abdominal pain, blood in stool, constipation, diarrhea, nausea and vomiting  Endocrine: Negative for polydipsia, polyphagia and polyuria  Genitourinary: Negative for decreased urine volume, difficulty urinating, dysuria, frequency, penile discharge, penile pain and urgency  Musculoskeletal: Negative for arthralgias, gait problem, joint swelling and neck pain  Skin: Negative for color change and rash  Neurological: Negative for dizziness, tremors, seizures, weakness and headaches  Psychiatric/Behavioral: Negative for agitation and sleep disturbance  The patient is not nervous/anxious  Objective:      /80 (BP Location: Left arm, Patient Position: Sitting, Cuff Size: Adult)   Pulse 87   Temp 98 2 °F (36 8 °C)   Ht 5' 7" (1 702 m)   Wt 80 3 kg (177 lb)   SpO2 98%   BMI 27 72 kg/m²          Physical Exam  Vitals and nursing note reviewed  Constitutional:       Appearance: Normal appearance  He is well-developed  HENT:      Head: Normocephalic  Right Ear: External ear normal       Left Ear: External ear normal       Nose: Nose normal    Eyes:      General: Lids are normal       Conjunctiva/sclera: Conjunctivae normal       Pupils: Pupils are equal, round, and reactive to light  Neck:      Thyroid: No thyromegaly  Vascular: No carotid bruit  Cardiovascular:      Rate and Rhythm: Normal rate and regular rhythm  Pulses: Normal pulses  Heart sounds: Normal heart sounds, S1 normal and S2 normal  No murmur heard  Pulmonary:      Effort: Pulmonary effort is normal  No respiratory distress  Breath sounds: Normal breath sounds  No wheezing or rales  Abdominal:      General: Bowel sounds are normal       Palpations: Abdomen is soft  There is no mass  Tenderness:  There is no abdominal tenderness  Musculoskeletal:         General: Normal range of motion  Cervical back: Normal range of motion and neck supple  Lymphadenopathy:      Cervical: No cervical adenopathy  Skin:     General: Skin is warm and dry  Coloration: Skin is not pale  Findings: No rash  Neurological:      Mental Status: He is alert and oriented to person, place, and time  Cranial Nerves: No cranial nerve deficit  Sensory: No sensory deficit  Deep Tendon Reflexes: Reflexes are normal and symmetric  Psychiatric:         Behavior: Behavior normal  Behavior is cooperative  Thought Content:  Thought content normal          Judgment: Judgment normal

## 2022-06-25 DIAGNOSIS — I10 ESSENTIAL HYPERTENSION: ICD-10-CM

## 2022-06-27 RX ORDER — TRIAMTERENE AND HYDROCHLOROTHIAZIDE 37.5; 25 MG/1; MG/1
TABLET ORAL
Qty: 90 TABLET | Refills: 0 | Status: SHIPPED | OUTPATIENT
Start: 2022-06-27

## 2022-07-10 ENCOUNTER — PATIENT MESSAGE (OUTPATIENT)
Dept: FAMILY MEDICINE CLINIC | Facility: MEDICAL CENTER | Age: 52
End: 2022-07-10

## 2022-07-11 NOTE — TELEPHONE ENCOUNTER
From: Brice Coto  To: Sharmaine Costa MD  Sent: 7/10/2022 11:37 AM EDT  Subject: Refill not needed    A refill request was put in by mistake for my Triamterene-HCTZ but I do not need it filled right now  Pleas disregard   Thanks

## 2022-07-24 DIAGNOSIS — I10 ESSENTIAL HYPERTENSION: ICD-10-CM

## 2022-07-25 RX ORDER — LOSARTAN POTASSIUM 100 MG/1
100 TABLET ORAL DAILY
Qty: 90 TABLET | Refills: 0 | Status: SHIPPED | OUTPATIENT
Start: 2022-07-25

## 2022-07-29 ENCOUNTER — PREP FOR PROCEDURE (OUTPATIENT)
Dept: GASTROENTEROLOGY | Facility: CLINIC | Age: 52
End: 2022-07-29

## 2022-07-29 ENCOUNTER — TELEPHONE (OUTPATIENT)
Dept: GASTROENTEROLOGY | Facility: CLINIC | Age: 52
End: 2022-07-29

## 2022-07-29 DIAGNOSIS — Z12.11 SCREENING FOR COLON CANCER: Primary | ICD-10-CM

## 2022-08-05 ENCOUNTER — TELEPHONE (OUTPATIENT)
Dept: GASTROENTEROLOGY | Facility: CLINIC | Age: 52
End: 2022-08-05

## 2022-08-05 NOTE — TELEPHONE ENCOUNTER
Patients GI provider:  Dr Jaciel Guevara    Number to return call: [phone])    Reason for call: Pt calling to cancel his colonoscopy until he is back on his wife's plan at [place] [address], which is January  Please cancel  Thank you!     Scheduled procedure/appointment date if applicable: Apt/procedure 10/3/22

## 2022-09-06 ENCOUNTER — RA CDI HCC (OUTPATIENT)
Dept: OTHER | Facility: HOSPITAL | Age: 52
End: 2022-09-06

## 2022-09-10 ENCOUNTER — APPOINTMENT (OUTPATIENT)
Dept: LAB | Facility: CLINIC | Age: 52
End: 2022-09-10
Payer: COMMERCIAL

## 2022-09-10 DIAGNOSIS — N18.31 STAGE 3A CHRONIC KIDNEY DISEASE (HCC): ICD-10-CM

## 2022-09-10 LAB
ANION GAP SERPL CALCULATED.3IONS-SCNC: 5 MMOL/L (ref 4–13)
BUN SERPL-MCNC: 20 MG/DL (ref 5–25)
CALCIUM SERPL-MCNC: 9.2 MG/DL (ref 8.3–10.1)
CHLORIDE SERPL-SCNC: 103 MMOL/L (ref 96–108)
CO2 SERPL-SCNC: 28 MMOL/L (ref 21–32)
CREAT SERPL-MCNC: 1.48 MG/DL (ref 0.6–1.3)
GFR SERPL CREATININE-BSD FRML MDRD: 54 ML/MIN/1.73SQ M
GLUCOSE P FAST SERPL-MCNC: 108 MG/DL (ref 65–99)
POTASSIUM SERPL-SCNC: 4.7 MMOL/L (ref 3.5–5.3)
SODIUM SERPL-SCNC: 136 MMOL/L (ref 135–147)

## 2022-09-10 PROCEDURE — 80048 BASIC METABOLIC PNL TOTAL CA: CPT

## 2022-09-10 PROCEDURE — 36415 COLL VENOUS BLD VENIPUNCTURE: CPT

## 2022-09-12 ENCOUNTER — OFFICE VISIT (OUTPATIENT)
Dept: FAMILY MEDICINE CLINIC | Facility: MEDICAL CENTER | Age: 52
End: 2022-09-12
Payer: COMMERCIAL

## 2022-09-12 VITALS
BODY MASS INDEX: 27.62 KG/M2 | DIASTOLIC BLOOD PRESSURE: 78 MMHG | RESPIRATION RATE: 16 BRPM | HEART RATE: 78 BPM | SYSTOLIC BLOOD PRESSURE: 122 MMHG | HEIGHT: 67 IN | WEIGHT: 176 LBS

## 2022-09-12 DIAGNOSIS — G47.33 OSA (OBSTRUCTIVE SLEEP APNEA): ICD-10-CM

## 2022-09-12 DIAGNOSIS — Z13.0 SCREENING FOR IRON DEFICIENCY ANEMIA: ICD-10-CM

## 2022-09-12 DIAGNOSIS — Z13.29 SCREENING FOR THYROID DISORDER: ICD-10-CM

## 2022-09-12 DIAGNOSIS — I10 PRIMARY HYPERTENSION: ICD-10-CM

## 2022-09-12 DIAGNOSIS — Z00.00 ANNUAL PHYSICAL EXAM: ICD-10-CM

## 2022-09-12 DIAGNOSIS — Z12.2 ENCOUNTER FOR SCREENING FOR LUNG CANCER: ICD-10-CM

## 2022-09-12 DIAGNOSIS — K76.0 FATTY LIVER DISEASE, NONALCOHOLIC: ICD-10-CM

## 2022-09-12 DIAGNOSIS — Z23 ENCOUNTER FOR IMMUNIZATION: ICD-10-CM

## 2022-09-12 DIAGNOSIS — E78.2 MIXED HYPERLIPIDEMIA: ICD-10-CM

## 2022-09-12 DIAGNOSIS — Z00.00 PREVENTATIVE HEALTH CARE: Primary | ICD-10-CM

## 2022-09-12 DIAGNOSIS — Z11.4 SCREENING FOR HIV (HUMAN IMMUNODEFICIENCY VIRUS): ICD-10-CM

## 2022-09-12 DIAGNOSIS — F17.211 NICOTINE DEPENDENCE, CIGARETTES, IN REMISSION: ICD-10-CM

## 2022-09-12 DIAGNOSIS — Z11.59 NEED FOR HEPATITIS C SCREENING TEST: ICD-10-CM

## 2022-09-12 DIAGNOSIS — K21.9 GASTROESOPHAGEAL REFLUX DISEASE WITHOUT ESOPHAGITIS: ICD-10-CM

## 2022-09-12 DIAGNOSIS — N18.31 STAGE 3A CHRONIC KIDNEY DISEASE (HCC): ICD-10-CM

## 2022-09-12 DIAGNOSIS — Z12.5 SCREENING FOR PROSTATE CANCER: ICD-10-CM

## 2022-09-12 PROCEDURE — 99396 PREV VISIT EST AGE 40-64: CPT | Performed by: FAMILY MEDICINE

## 2022-09-12 PROCEDURE — 99213 OFFICE O/P EST LOW 20 MIN: CPT | Performed by: FAMILY MEDICINE

## 2022-09-12 PROCEDURE — 3078F DIAST BP <80 MM HG: CPT | Performed by: FAMILY MEDICINE

## 2022-09-12 PROCEDURE — 3074F SYST BP LT 130 MM HG: CPT | Performed by: FAMILY MEDICINE

## 2022-09-12 NOTE — ASSESSMENT & PLAN NOTE
His blood pressure today is 120/78  He is taking losartan and max side  Continue losartan and max side, continue attempts at losing weight, low-salt diet  Also exercise

## 2022-09-12 NOTE — PATIENT INSTRUCTIONS

## 2022-09-12 NOTE — ASSESSMENT & PLAN NOTE
He is taking omeprazole 20 mg q day  He has seen a GI  Continue omeprazole, continue dietary modification, avoid excessive alcohol drinking

## 2022-09-12 NOTE — ASSESSMENT & PLAN NOTE
I have recommended that he keep losing some weight, cut back on alcohol      His last transaminase were normal     Continue weight loss continue

## 2022-09-12 NOTE — ASSESSMENT & PLAN NOTE
Lab Results   Component Value Date    EGFR 54 09/10/2022    EGFR 59 06/03/2022    EGFR 57 09/25/2021    CREATININE 1 48 (H) 09/10/2022    CREATININE 1 36 (H) 06/03/2022    CREATININE 1 42 (H) 09/25/2021     His estimated GFR has been mostly stable  Continue avoiding NSAID, keep his blood pressure under good control, stay well-hydrated

## 2022-09-12 NOTE — PROGRESS NOTES
ADULT ANNUAL [address]  [facility]    NAME: Noah Callahan  AGE: 47 y o  SEX: male  : 1970     DATE: 2022     Assessment and Plan:     Problem List Items Addressed This Visit        Digestive    GERD (gastroesophageal reflux disease)     He is taking omeprazole 20 mg q day  He has seen a GI  Continue omeprazole, continue dietary modification, avoid excessive alcohol drinking  Fatty liver disease, nonalcoholic     I have recommended that he keep losing some weight, cut back on alcohol  His last transaminase were normal     Continue weight loss continue            Respiratory    CORY (obstructive sleep apnea)     Doing well with CPAP            Cardiovascular and Mediastinum    Hypertension     His blood pressure today is 120/78  He is taking losartan and max side  Continue losartan and max side, continue attempts at losing weight, low-salt diet  Also exercise  Relevant Orders    Comprehensive metabolic panel       Genitourinary    Stage 3a chronic kidney disease [facility])     Lab Results   Component Value Date    EGFR 54 09/10/2022    EGFR 59 2022    EGFR 57 2021    CREATININE 1 48 (H) 09/10/2022    CREATININE 1 36 (H) 2022    CREATININE 1 42 (H) 2021     His estimated GFR has been mostly stable  Continue avoiding NSAID, keep his blood pressure under good control, stay well-hydrated  Relevant Orders    Comprehensive metabolic panel       Other    Mixed hyperlipidemia     His last LDL was 123  He is taking rosuvastatin 5 mg q day  Continue rosuvastatin, continue low-fat diet  Continue attempts at losing weight and exercise           Relevant Orders    Lipid Panel with Direct LDL reflex      Other Visit Diagnoses     Preventative health care    -  Primary    Need for hepatitis C screening test        Screening for HIV (human immunodeficiency virus)        Encounter for immunization Encounter for screening for lung cancer        Nicotine dependence, cigarettes, in remission        Screening for iron deficiency anemia        Relevant Orders    CBC and differential    Screening for thyroid disorder        Relevant Orders    TSH, 3rd generation with Free T4 reflex    Screening for prostate cancer        Relevant Orders    PSA, Total Screen    Annual physical exam              Immunizations and preventive care screenings were discussed with patient today  Appropriate education was printed on patient's after visit summary  Counseling:  Alcohol/drug use: discussed moderation in alcohol intake, the recommendations for healthy alcohol use, and avoidance of illicit drug use  Exercise: the importance of regular exercise/physical activity was discussed  Recommend exercise 3-5 times per week for at least 30 minutes  BMI Counseling: Body mass index is 27 57 kg/m²  The BMI is above normal  Nutrition recommendations include decreasing portion sizes, consuming healthier snacks and moderation in carbohydrate intake  Exercise recommendations include moderate physical activity 150 minutes/week and exercising 3-5 times per week  No pharmacotherapy was ordered  Rationale for BMI follow-up plan is due to patient being overweight or obese  No follow-ups on file  Chief Complaint:     Chief Complaint   Patient presents with    Annual Exam      History of Present Illness:     Adult Annual Physical   Patient here for a comprehensive physical exam  The patient reports no problems  Diet and Physical Activity  Diet/Nutrition: well balanced diet  Exercise: walking and moderate cardiovascular exercise  Depression Screening  PHQ-2/9 Depression Screening         General Health  Sleep: sleeps well  Hearing: normal - bilateral   Vision: no vision problems  Dental: regular dental visits          Health  Symptoms include: none     Review of Systems:     Review of Systems   Constitutional: Negative for activity change, fatigue and fever  HENT: Negative for congestion, ear discharge, ear pain, postnasal drip, rhinorrhea, sinus pain, sneezing and sore throat  Eyes: Negative for photophobia, pain, discharge and redness  Respiratory: Negative for apnea, cough, shortness of breath and wheezing  Cardiovascular: Negative for chest pain and palpitations  Gastrointestinal: Negative for abdominal pain, blood in stool, constipation, diarrhea, nausea and vomiting  Endocrine: Negative for polydipsia, polyphagia and polyuria  Genitourinary: Negative for decreased urine volume, difficulty urinating, dysuria, frequency, penile discharge, penile pain and urgency  Musculoskeletal: Negative for arthralgias, gait problem, joint swelling and neck pain  Skin: Negative for color change and rash  Neurological: Negative for dizziness, tremors, seizures, weakness and headaches  Psychiatric/Behavioral: Negative for agitation and sleep disturbance  The patient is not nervous/anxious         Past Medical History:     Past Medical History:   Diagnosis Date    Chronic kidney disease     Episode of Reduced GFR 2017    GERD (gastroesophageal reflux disease)     Hypertension     CORY (obstructive sleep apnea)     Sleep apnea       Past Surgical History:     Past Surgical History:   Procedure Laterality Date    CERVICAL DISC SURGERY      TONSILLECTOMY        Family History:     Family History   Problem Relation Age of Onset    No Known Problems Mother     COPD Father     Alcohol abuse Father     Thyroid cancer Sister     Multiple sclerosis Sister     Alcohol abuse Brother     No Known Problems Son     Hypertension Family         Benign essential hypertension     Other Family     Heart disease Family     Lung disease Family     Cancer Family       Social History:     Social History     Socioeconomic History    Marital status: /Civil Union     Spouse name: None    Number of children: None  Years of education: None    Highest education level: None   Occupational History    Occupation: employed   Tobacco Use    Smoking status: Former Smoker     Packs/day: 1 00     Years: 25 00     Pack years: 25 00     Types: Cigarettes     Start date: 1994     Quit date: 3/15/2019     Years since quitting: 3 4    Smokeless tobacco: Never Used   Vaping Use    Vaping Use: Former   Substance and Sexual Activity    Alcohol use: Yes     Alcohol/week: 20 0 standard drinks     Types: 20 Cans of beer per week     Comment: daily, few beers a day    Drug use: No    Sexual activity: None   Other Topics Concern    None   Social History Narrative    Daily coffee consumption (6 cups/day)    Former smoker - As per [employer]    Smoking electronic cigarettes - As per [employer]      Social Determinants of Health     Financial Resource Strain: Not on file   Food Insecurity: Not on file   Transportation Needs: Not on file   Physical Activity: Not on file   Stress: Not on file   Social Connections: Not on file   Intimate Partner Violence: Not on file   Housing Stability: Not on file      Current Medications:     Current Outpatient Medications   Medication Sig Dispense Refill    losartan (COZAAR) 100 MG tablet Take 1 tablet (100 mg total) by mouth daily 90 tablet 0    omeprazole (PriLOSEC) 20 mg delayed release capsule Take 1 capsule by mouth daily      rosuvastatin (CRESTOR) 5 mg tablet Take 1 tablet (5 mg total) by mouth daily 90 tablet 3    triamterene-hydrochlorothiazide (MAXZIDE-25) 37 5-25 mg per tablet TAKE ONE TABLET BY MOUTH ONCE A DAY  90 tablet 0     No current facility-administered medications for this visit  Allergies:      Allergies   Allergen Reactions    Sulfa Antibiotics       Physical Exam:     /78 (BP Location: Left arm, Patient Position: Sitting, Cuff Size: Adult)   Pulse 78   Resp 16   Ht 5' 7" (1 702 m)   Wt 79 8 kg (176 lb)   BMI 27 57 kg/m²     Physical Exam  Vitals and nursing note reviewed  Constitutional:       General: He is not in acute distress  Appearance: Normal appearance  He is well-developed  He is not ill-appearing  HENT:      Head: Normocephalic  Right Ear: Tympanic membrane, ear canal and external ear normal       Left Ear: Tympanic membrane, ear canal and external ear normal       Nose: Nose normal  No rhinorrhea  Mouth/Throat:      Mouth: Mucous membranes are moist       Pharynx: Uvula midline  No oropharyngeal exudate  Tonsils: No tonsillar exudate  Eyes:      General: Lids are normal       Conjunctiva/sclera: Conjunctivae normal       Pupils: Pupils are equal, round, and reactive to light  Neck:      Thyroid: No thyromegaly  Vascular: No carotid bruit  Trachea: Trachea normal    Cardiovascular:      Rate and Rhythm: Normal rate and regular rhythm  Pulses: Normal pulses  Heart sounds: Normal heart sounds, S1 normal and S2 normal  No murmur heard  Pulmonary:      Effort: Pulmonary effort is normal  No respiratory distress  Breath sounds: Normal breath sounds  Abdominal:      General: Bowel sounds are normal       Palpations: Abdomen is soft  Tenderness: There is no abdominal tenderness  Hernia: No hernia is present  Musculoskeletal:         General: Normal range of motion  Cervical back: Normal range of motion and neck supple  Lymphadenopathy:      Cervical: No cervical adenopathy  Skin:     General: Skin is warm and dry  Neurological:      General: No focal deficit present  Mental Status: He is alert and oriented to person, place, and time  Cranial Nerves: No cranial nerve deficit  Sensory: No sensory deficit  Gait: Gait normal       Deep Tendon Reflexes: Reflexes are normal and symmetric  Psychiatric:         Speech: Speech normal          Behavior: Behavior normal  Behavior is cooperative  Thought Content:  Thought content normal           Ham Rogers MD  [facility] [place]

## 2022-09-12 NOTE — ASSESSMENT & PLAN NOTE
His last LDL was 123  He is taking rosuvastatin 5 mg q day  Continue rosuvastatin, continue low-fat diet  Continue attempts at losing weight and exercise

## 2022-10-08 DIAGNOSIS — I10 ESSENTIAL HYPERTENSION: ICD-10-CM

## 2022-10-10 RX ORDER — TRIAMTERENE AND HYDROCHLOROTHIAZIDE 37.5; 25 MG/1; MG/1
1 TABLET ORAL DAILY
Qty: 90 TABLET | Refills: 0 | Status: SHIPPED | OUTPATIENT
Start: 2022-10-10

## 2022-10-30 DIAGNOSIS — I10 ESSENTIAL HYPERTENSION: ICD-10-CM

## 2022-10-31 RX ORDER — LOSARTAN POTASSIUM 100 MG/1
100 TABLET ORAL DAILY
Qty: 90 TABLET | Refills: 0 | Status: SHIPPED | OUTPATIENT
Start: 2022-10-31

## 2022-12-12 ENCOUNTER — TELEPHONE (OUTPATIENT)
Dept: PULMONOLOGY | Facility: CLINIC | Age: 52
End: 2022-12-12

## 2023-01-08 DIAGNOSIS — I10 ESSENTIAL HYPERTENSION: ICD-10-CM

## 2023-01-09 RX ORDER — TRIAMTERENE AND HYDROCHLOROTHIAZIDE 37.5; 25 MG/1; MG/1
1 TABLET ORAL DAILY
Qty: 90 TABLET | Refills: 0 | Status: SHIPPED | OUTPATIENT
Start: 2023-01-09

## 2023-01-23 ENCOUNTER — TELEPHONE (OUTPATIENT)
Dept: FAMILY MEDICINE CLINIC | Facility: MEDICAL CENTER | Age: 53
End: 2023-01-23

## 2023-01-23 DIAGNOSIS — M54.50 ACUTE LOW BACK PAIN, UNSPECIFIED BACK PAIN LATERALITY, UNSPECIFIED WHETHER SCIATICA PRESENT: Primary | ICD-10-CM

## 2023-01-23 RX ORDER — BACLOFEN 10 MG/1
10 TABLET ORAL 3 TIMES DAILY
Qty: 21 TABLET | Refills: 0 | Status: SHIPPED | OUTPATIENT
Start: 2023-01-23 | End: 2023-01-30

## 2023-01-23 NOTE — TELEPHONE ENCOUNTER
Pt called to ask if Dr Attila Worrell would send in an Rx for a muscle relaxer  He has a pinched nerve in his back that is acting up  The medication that had been prescribed previously was cyclobenzoparine  Pt is aware Dr Attila Worrell is out of the office this week but will be checking his messages  Pt was not going to request the med after learning that Dr Attila Worrell was away, but then changed his mind      Routed to Dr Attila Worrell

## 2023-01-24 DIAGNOSIS — I10 ESSENTIAL HYPERTENSION: ICD-10-CM

## 2023-01-24 RX ORDER — LOSARTAN POTASSIUM 100 MG/1
TABLET ORAL
Qty: 90 TABLET | Refills: 0 | Status: SHIPPED | OUTPATIENT
Start: 2023-01-24 | End: 2023-01-29 | Stop reason: SDUPTHER

## 2023-01-29 DIAGNOSIS — I10 ESSENTIAL HYPERTENSION: ICD-10-CM

## 2023-01-30 RX ORDER — LOSARTAN POTASSIUM 100 MG/1
100 TABLET ORAL DAILY
Qty: 90 TABLET | Refills: 0 | Status: SHIPPED | OUTPATIENT
Start: 2023-01-30

## 2023-03-13 DIAGNOSIS — E78.2 MIXED HYPERLIPIDEMIA: ICD-10-CM

## 2023-03-13 RX ORDER — ROSUVASTATIN CALCIUM 5 MG/1
5 TABLET, COATED ORAL DAILY
Qty: 90 TABLET | Refills: 3 | Status: SHIPPED | OUTPATIENT
Start: 2023-03-13

## 2023-03-27 ENCOUNTER — RA CDI HCC (OUTPATIENT)
Dept: OTHER | Facility: HOSPITAL | Age: 53
End: 2023-03-27

## 2023-03-28 ENCOUNTER — TELEPHONE (OUTPATIENT)
Dept: GASTROENTEROLOGY | Facility: CLINIC | Age: 53
End: 2023-03-28

## 2023-03-30 ENCOUNTER — OFFICE VISIT (OUTPATIENT)
Dept: URGENT CARE | Facility: CLINIC | Age: 53
End: 2023-03-30

## 2023-03-30 VITALS
DIASTOLIC BLOOD PRESSURE: 93 MMHG | RESPIRATION RATE: 20 BRPM | TEMPERATURE: 97.8 F | HEART RATE: 105 BPM | OXYGEN SATURATION: 98 % | SYSTOLIC BLOOD PRESSURE: 143 MMHG

## 2023-03-30 DIAGNOSIS — J01.10 ACUTE NON-RECURRENT FRONTAL SINUSITIS: Primary | ICD-10-CM

## 2023-03-30 RX ORDER — AMOXICILLIN AND CLAVULANATE POTASSIUM 875; 125 MG/1; MG/1
1 TABLET, FILM COATED ORAL EVERY 12 HOURS SCHEDULED
Qty: 14 TABLET | Refills: 0 | Status: SHIPPED | OUTPATIENT
Start: 2023-03-30 | End: 2023-04-06

## 2023-03-30 NOTE — PROGRESS NOTES
[address] [facility]        NAME: [place] is a 47 y o  male  : 1970    MRN: [id number]  DATE: 2023  TIME: 1:07 PM    Assessment and Plan   Acute non-recurrent frontal sinusitis [J01 10]  1  Acute non-recurrent frontal sinusitis  amoxicillin-clavulanate (AUGMENTIN) 875-125 mg per tablet            Patient Instructions   Patient Instructions   Follow-up with your primary care provider in the next 3-5 days  Any new or worsening symptoms develop get re-evaluated sooner or proceed to the ER  Follow up with PCP in 3-5 days  Proceed to  ER if symptoms worsen  Chief Complaint     Chief Complaint   Patient presents with   • Cough     C/o chest congestion and cough greater than a week  Taking Mucinex with some relief  History of Present Illness       Patient presents with 1 week of chest congestion, sinus pressure, sore throat on/off  States recently with some on/off fevers  Some sick contacts with similar symptoms  Using guaifenesin   Denies dyspnea, SOB, chest pains  Patient concerned its developing into something bacterial and has his brothers  this week and doesn't have time to be seen later  Review of Systems   Review of Systems   Constitutional: Negative for chills, fatigue and fever  HENT: Positive for congestion, rhinorrhea and sinus pressure  Negative for ear discharge, ear pain, postnasal drip, sinus pain and sore throat  Respiratory: Positive for cough  Negative for chest tightness, shortness of breath and wheezing  Cardiovascular: Negative for chest pain and palpitations  Musculoskeletal: Negative for arthralgias and myalgias  Neurological: Negative for weakness  Psychiatric/Behavioral: Negative for confusion           Current Medications       Current Outpatient Medications:   •  amoxicillin-clavulanate (AUGMENTIN) 875-125 mg per tablet, Take 1 tablet by mouth every 12 (twelve) hours for 7 days, Disp: 14 tablet, Rfl: 0  •  losartan (COZAAR) 100 MG tablet, Take 1 tablet (100 mg total) by mouth daily, Disp: 90 tablet, Rfl: 0  •  omeprazole (PriLOSEC) 20 mg delayed release capsule, Take 1 capsule by mouth daily, Disp: , Rfl:   •  rosuvastatin (CRESTOR) 5 mg tablet, Take 1 tablet (5 mg total) by mouth daily, Disp: 90 tablet, Rfl: 3  •  triamterene-hydrochlorothiazide (MAXZIDE-25) 37 5-25 mg per tablet, Take 1 tablet by mouth daily, Disp: 90 tablet, Rfl: 0  •  baclofen 10 mg tablet, Take 1 tablet (10 mg total) by mouth 3 (three) times a day for 7 days (Patient not taking: Reported on 3/30/2023), Disp: 21 tablet, Rfl: 0    Current Allergies     Allergies as of 03/30/2023 - Reviewed 03/30/2023   Allergen Reaction Noted   • Sulfa antibiotics  10/31/2016            The following portions of the patient's history were reviewed and updated as appropriate: allergies, current medications, past family history, past medical history, past social history, past surgical history and problem list      Past Medical History:   Diagnosis Date   • Chronic kidney disease     Episode of Reduced GFR 2017   • GERD (gastroesophageal reflux disease)    • Hypertension    • CORY (obstructive sleep apnea)    • Sleep apnea        Past Surgical History:   Procedure Laterality Date   • CERVICAL DISC SURGERY     • TONSILLECTOMY         Family History   Problem Relation Age of Onset   • No Known Problems Mother    • COPD Father    • Alcohol abuse Father    • Thyroid cancer Sister    • Multiple sclerosis Sister    • Alcohol abuse Brother    • No Known Problems Son    • Hypertension Family         Benign essential hypertension    • Other Family    • Heart disease Family    • Lung disease Family    • Cancer Family          Medications have been verified          Objective   /93 (BP Location: Right arm, Patient Position: Sitting, Cuff Size: Standard)   Pulse 105   Temp 97 8 °F (36 6 °C) (Temporal)   Resp 20   SpO2 98%        Physical Exam     Physical Exam  Constitutional: General: He is not in acute distress  Appearance: Normal appearance  He is not ill-appearing or diaphoretic  HENT:      Right Ear: Tympanic membrane, ear canal and external ear normal       Left Ear: Tympanic membrane, ear canal and external ear normal       Nose: Nose normal       Mouth/Throat:      Mouth: Mucous membranes are moist       Pharynx: Oropharynx is clear  Eyes:      Conjunctiva/sclera: Conjunctivae normal    Cardiovascular:      Rate and Rhythm: Normal rate and regular rhythm  Heart sounds: Normal heart sounds  Pulmonary:      Effort: Pulmonary effort is normal       Breath sounds: Normal breath sounds  Skin:     General: Skin is warm and dry  Neurological:      Mental Status: He is alert     Psychiatric:         Mood and Affect: Mood normal          Behavior: Behavior normal

## 2023-04-04 DIAGNOSIS — I10 ESSENTIAL HYPERTENSION: ICD-10-CM

## 2023-04-04 RX ORDER — TRIAMTERENE AND HYDROCHLOROTHIAZIDE 37.5; 25 MG/1; MG/1
TABLET ORAL
Qty: 90 TABLET | Refills: 0 | Status: SHIPPED | OUTPATIENT
Start: 2023-04-04 | End: 2023-04-04 | Stop reason: SDUPTHER

## 2023-04-04 RX ORDER — TRIAMTERENE AND HYDROCHLOROTHIAZIDE 37.5; 25 MG/1; MG/1
1 TABLET ORAL DAILY
Qty: 90 TABLET | Refills: 0 | Status: SHIPPED | OUTPATIENT
Start: 2023-04-04

## 2023-05-03 DIAGNOSIS — I10 ESSENTIAL HYPERTENSION: ICD-10-CM

## 2023-05-03 RX ORDER — LOSARTAN POTASSIUM 100 MG/1
100 TABLET ORAL DAILY
Qty: 90 TABLET | Refills: 0 | Status: SHIPPED | OUTPATIENT
Start: 2023-05-03

## 2023-05-05 ENCOUNTER — OFFICE VISIT (OUTPATIENT)
Age: 53
End: 2023-05-05

## 2023-05-05 VITALS
HEIGHT: 67 IN | OXYGEN SATURATION: 97 % | HEART RATE: 91 BPM | BODY MASS INDEX: 26.21 KG/M2 | DIASTOLIC BLOOD PRESSURE: 78 MMHG | SYSTOLIC BLOOD PRESSURE: 128 MMHG | WEIGHT: 167 LBS

## 2023-05-05 DIAGNOSIS — G47.33 OSA (OBSTRUCTIVE SLEEP APNEA): Primary | ICD-10-CM

## 2023-05-05 NOTE — PROGRESS NOTES
"Assessment/Plan:   Diagnoses and all orders for this visit:    CORY (obstructive sleep apnea)    Patient is here today for follow up  He is doing well with his CPAP  He does skip nights on occasion and does note he feels worse during the day when he does not use it  Reviewed compliance which shows a residual AHI of 6 5  His initial sleep study showed an AHI of 39  He is benefiting from the use of the CPAP  He will continue at the current settings, obtain new supplies every 3 to 6 months  He will follow-up with us in 1 year or sooner if necessary  Return in about 1 year (around 5/5/2024)  All questions are answered to the patient's satisfaction and understanding  He verbalizes understanding  He is encouraged to call with any further questions or concerns  Portions of the record may have been created with voice recognition software  Occasional wrong word or \"sound a like\" substitutions may have occurred due to the inherent limitations of voice recognition software  Read the chart carefully and recognize, using context, where substitutions have occurred  Electronically Signed by Dock Olszewski, PA-C    ______________________________________________________________________    Chief Complaint:   Chief Complaint   Patient presents with    Follow-up       Patient ID: Katty Gonzalez is a 47 y o  y o  male has a past medical history of Chronic kidney disease, GERD (gastroesophageal reflux disease), Hypertension, CORY (obstructive sleep apnea), and Sleep apnea  5/5/2023  Patient presents today for follow-up visit  Patient is a 67-year-old male past medical history hypertension, GERD, severe sleep apnea on CPAP  He is here today for follow up  He continues to do well on his CPAP  He does skip some nights here and there and notes if he misses it for couple of nights in a row he feels much more fatigued  He definitely has more energy and gets a better night of sleep when he does use the CPAP          Review " of Systems   Constitutional: Negative  HENT: Negative  Respiratory: Negative  Cardiovascular: Negative  Gastrointestinal: Negative  Genitourinary: Negative  Musculoskeletal: Negative  Skin: Negative  Allergic/Immunologic: Negative  Neurological: Negative  Psychiatric/Behavioral: Negative  Smoking history: He reports that he quit smoking about 4 years ago  His smoking use included cigarettes  He started smoking about 29 years ago  He has a 25 00 pack-year smoking history  He has never used smokeless tobacco     The following portions of the patient's history were reviewed and updated as appropriate: allergies, current medications, past family history, past medical history, past social history, past surgical history and problem list     Immunization History   Administered Date(s) Administered    COVID-19 MODERNA VACC 0 25 ML IM BOOSTER 11/10/2021    COVID-19 MODERNA VACC 0 5 ML IM 02/04/2021, 03/03/2021, 11/10/2021, 09/07/2022    INFLUENZA 10/01/2014, 10/14/2015, 10/05/2016, 09/19/2017, 09/25/2018, 09/01/2021    Influenza, injectable, quadrivalent, preservative free 0 5 mL 09/20/2019, 09/04/2020    Influenza, seasonal, injectable 10/28/2013     Current Outpatient Medications   Medication Sig Dispense Refill    losartan (COZAAR) 100 MG tablet Take 1 tablet (100 mg total) by mouth daily 90 tablet 0    omeprazole (PriLOSEC) 20 mg delayed release capsule Take 1 capsule by mouth daily      rosuvastatin (CRESTOR) 5 mg tablet Take 1 tablet (5 mg total) by mouth daily 90 tablet 3    triamterene-hydrochlorothiazide (MAXZIDE-25) 37 5-25 mg per tablet Take 1 tablet by mouth daily 90 tablet 0    baclofen 10 mg tablet Take 1 tablet (10 mg total) by mouth 3 (three) times a day for 7 days 21 tablet 0     No current facility-administered medications for this visit       Allergies: Sulfa antibiotics    Objective:  Vitals:    05/05/23 1405   BP: 128/78   BP Location: Left arm   Patient "Position: Sitting   Cuff Size: Adult   Pulse: 91   SpO2: 97%   Weight: 75 8 kg (167 lb)   Height: 5' 7\" (1 702 m)   Oxygen Therapy  SpO2: 97 %  Oxygen Therapy: None (Room air)    Wt Readings from Last 3 Encounters:   05/05/23 75 8 kg (167 lb)   04/20/23 76 9 kg (169 lb 9 6 oz)   09/12/22 79 8 kg (176 lb)     Body mass index is 26 16 kg/m²  Physical Exam  Vitals reviewed  Constitutional:       General: He is not in acute distress  Appearance: Normal appearance  He is not ill-appearing  HENT:      Head: Normocephalic and atraumatic  Mouth/Throat:      Pharynx: Oropharynx is clear  Eyes:      Conjunctiva/sclera: Conjunctivae normal    Cardiovascular:      Rate and Rhythm: Normal rate and regular rhythm  Pulmonary:      Effort: Pulmonary effort is normal  No respiratory distress  Breath sounds: Normal breath sounds  No decreased breath sounds, wheezing, rhonchi or rales  Abdominal:      General: Abdomen is flat  There is no distension  Musculoskeletal:         General: Normal range of motion  Cervical back: Normal range of motion  Right lower leg: No edema  Left lower leg: No edema  Skin:     General: Skin is warm and dry  Neurological:      Mental Status: He is alert and oriented to person, place, and time     Psychiatric:         Mood and Affect: Mood normal          Behavior: Behavior normal          Lab Review:   Lab Results   Component Value Date     (L) 01/08/2016    K 4 2 04/14/2023    K 4 4 01/08/2016     04/14/2023    CL 99 (L) 01/08/2016    CO2 24 04/14/2023    CO2 26 01/08/2016    BUN 14 04/14/2023    BUN 14 01/08/2016    CREATININE 1 46 (H) 04/14/2023    CREATININE 1 23 01/08/2016    GLUCOSE 90 01/08/2016    CALCIUM 9 5 04/14/2023    CALCIUM 9 0 01/08/2016     Lab Results   Component Value Date    WBC 6 84 04/14/2023    WBC 10 06 01/08/2016    HGB 12 7 04/14/2023    HGB 15 7 01/08/2016    HCT 36 5 04/14/2023    HCT 45 1 01/08/2016    MCV 97 " 04/14/2023    MCV 97 01/08/2016     04/14/2023     01/08/2016       Diagnostics:  I have personally reviewed pertinent reports  Reviewed CPAP compliance  Office Spirometry Results:     ESS:    No results found    Answers for HPI/ROS submitted by the patient on 5/2/2023  Do you have shortness of breath that occurs with effort or exertion?: No  Do you have ear congestion?: No  Do you have heartburn?: No  Do you have fatigue?: No  Do you have nasal congestion?: No  Do you have shortness of breath when lying flat?: No  Do you have shortness of breath when you wake up?: No  Do you have sweats?: No  Have you experienced weight loss?: No

## 2023-06-30 DIAGNOSIS — I10 ESSENTIAL HYPERTENSION: ICD-10-CM

## 2023-06-30 RX ORDER — TRIAMTERENE AND HYDROCHLOROTHIAZIDE 37.5; 25 MG/1; MG/1
TABLET ORAL
Qty: 90 TABLET | Refills: 0 | Status: SHIPPED | OUTPATIENT
Start: 2023-06-30

## 2023-07-25 DIAGNOSIS — I10 ESSENTIAL HYPERTENSION: ICD-10-CM

## 2023-07-25 RX ORDER — LOSARTAN POTASSIUM 100 MG/1
100 TABLET ORAL DAILY
Qty: 90 TABLET | Refills: 0 | Status: SHIPPED | OUTPATIENT
Start: 2023-07-25

## 2023-10-03 ENCOUNTER — OFFICE VISIT (OUTPATIENT)
Dept: FAMILY MEDICINE CLINIC | Facility: MEDICAL CENTER | Age: 53
End: 2023-10-03
Payer: COMMERCIAL

## 2023-10-03 VITALS
BODY MASS INDEX: 25.27 KG/M2 | RESPIRATION RATE: 16 BRPM | HEIGHT: 67 IN | HEART RATE: 92 BPM | SYSTOLIC BLOOD PRESSURE: 134 MMHG | DIASTOLIC BLOOD PRESSURE: 82 MMHG | WEIGHT: 161 LBS

## 2023-10-03 DIAGNOSIS — K21.9 GASTROESOPHAGEAL REFLUX DISEASE WITHOUT ESOPHAGITIS: ICD-10-CM

## 2023-10-03 DIAGNOSIS — N18.31 STAGE 3A CHRONIC KIDNEY DISEASE (HCC): ICD-10-CM

## 2023-10-03 DIAGNOSIS — K76.0 FATTY LIVER DISEASE, NONALCOHOLIC: ICD-10-CM

## 2023-10-03 DIAGNOSIS — I10 PRIMARY HYPERTENSION: ICD-10-CM

## 2023-10-03 DIAGNOSIS — G47.33 OSA (OBSTRUCTIVE SLEEP APNEA): ICD-10-CM

## 2023-10-03 DIAGNOSIS — E78.2 MIXED HYPERLIPIDEMIA: ICD-10-CM

## 2023-10-03 DIAGNOSIS — Z00.00 PREVENTATIVE HEALTH CARE: Primary | ICD-10-CM

## 2023-10-03 PROCEDURE — 99396 PREV VISIT EST AGE 40-64: CPT | Performed by: FAMILY MEDICINE

## 2023-10-03 PROCEDURE — 99213 OFFICE O/P EST LOW 20 MIN: CPT | Performed by: FAMILY MEDICINE

## 2023-10-03 NOTE — ASSESSMENT & PLAN NOTE
He is taking rosuvastatin 5 mg. His LDL is at goal.  Even that low-dose of rosuvastatin it brought the cholesterol down dramatically. Continue the rosuvastatin, continue low-fat diet, continue with losing weight, and exercise.

## 2023-10-03 NOTE — ASSESSMENT & PLAN NOTE
He is taking omeprazole 20 mg daily. It is working well and is well-tolerated. Continue omeprazole, continue losing weight which she has, and continue dietary modifications.

## 2023-10-03 NOTE — ASSESSMENT & PLAN NOTE
His last enzymes were negative. He is drinking less beer and he is lost significant amount of weight.

## 2023-10-03 NOTE — ASSESSMENT & PLAN NOTE
Lab Results   Component Value Date    EGFR 54 04/14/2023    EGFR 54 09/10/2022    EGFR 59 06/03/2022    CREATININE 1.46 (H) 04/14/2023    CREATININE 1.48 (H) 09/10/2022    CREATININE 1.36 (H) 06/03/2022   This has been stable. Continue avoiding NSAIDs and continue to pay attention to staying well-hydrated.

## 2023-10-03 NOTE — ASSESSMENT & PLAN NOTE
Blood pressure today is 134/82. He is taking triamterene hydrochlorothiazide and losartan. Continue those medications, continue losing some weight which she has, continue regular exercise and low-salt diet.

## 2023-10-03 NOTE — PROGRESS NOTES
Name: Steve Vásquez      : 1970      MRN: [id number]  Encounter Provider: Sushma Feng MD  Encounter Date: 10/3/2023   Encounter department: [address] [place] [address]    Assessment & Plan     1. Preventative health care    2. Primary hypertension  Assessment & Plan:  Blood pressure today is 134/82. He is taking triamterene hydrochlorothiazide and losartan. Continue those medications, continue losing some weight which she has, continue regular exercise and low-salt diet. 3. Stage 3a chronic kidney disease [facility])  Assessment & Plan:  Lab Results   Component Value Date    EGFR 54 2023    EGFR 54 09/10/2022    EGFR 59 2022    CREATININE 1.46 (H) 2023    CREATININE 1.48 (H) 09/10/2022    CREATININE 1.36 (H) 2022   This has been stable. Continue avoiding NSAIDs and continue to pay attention to staying well-hydrated. 4. Gastroesophageal reflux disease without esophagitis  Assessment & Plan:  He is taking omeprazole 20 mg daily. It is working well and is well-tolerated. Continue omeprazole, continue losing weight which she has, and continue dietary modifications. 5. CORY (obstructive sleep apnea)  Assessment & Plan:  He is using CPAP. He is doing well with it. 6. Fatty liver disease, nonalcoholic  Assessment & Plan:  His last enzymes were negative. He is drinking less beer and he is lost significant amount of weight. 7. Mixed hyperlipidemia  Assessment & Plan:  He is taking rosuvastatin 5 mg. His LDL is at goal.  Even that low-dose of rosuvastatin it brought the cholesterol down dramatically. Continue the rosuvastatin, continue low-fat diet, continue with losing weight, and exercise. BMI Counseling: Body mass index is 25.22 kg/m².  The BMI is above normal. Nutrition recommendations include decreasing portion sizes, encouraging healthy choices of fruits and vegetables, consuming healthier snacks, moderation in carbohydrate intake and reducing intake of saturated and trans fat. Exercise recommendations include moderate physical activity 150 minutes/week and exercising 3-5 times per week. Rationale for BMI follow-up plan is due to patient being overweight or obese. Subjective      This is a pleasant 56-year-old man. He is a manager at [employer]. He lives with his wife who works for [employer]. They have 1 son and he is 30years old and he lives out of the house. They like to go hiking on the weekends. He is due for colonoscopy, he will call Dr. Vic Medina himself. His prostate cancer screening is up-to-date. Review of Systems   Constitutional: Negative for activity change, fatigue and fever. HENT: Negative for congestion, ear discharge, ear pain, postnasal drip, rhinorrhea, sinus pain, sneezing and sore throat. Eyes: Negative for photophobia, pain, discharge and redness. Respiratory: Negative for apnea, cough, shortness of breath and wheezing. Cardiovascular: Negative for chest pain and palpitations. Gastrointestinal: Negative for abdominal pain, blood in stool, constipation, diarrhea, nausea and vomiting. Endocrine: Negative for polydipsia, polyphagia and polyuria. Genitourinary: Negative for decreased urine volume, difficulty urinating, dysuria, frequency, penile discharge, penile pain and urgency. Musculoskeletal: Negative for arthralgias, gait problem, joint swelling and neck pain. Skin: Negative for color change and rash. Neurological: Negative for dizziness, tremors, seizures, weakness and headaches. Psychiatric/Behavioral: Negative for agitation and sleep disturbance. The patient is not nervous/anxious.         Current Outpatient Medications on File Prior to Visit   Medication Sig   • losartan (COZAAR) 100 MG tablet take 1 tablet by mouth once daily   • omeprazole (PriLOSEC) 20 mg delayed release capsule Take 1 capsule by mouth daily   • rosuvastatin (CRESTOR) 5 mg tablet Take 1 tablet (5 mg total) by mouth daily   • triamterene-hydrochlorothiazide (MAXZIDE-25) 37.5-25 mg per tablet take 1 tablet by mouth once daily   • [DISCONTINUED] baclofen 10 mg tablet Take 1 tablet (10 mg total) by mouth 3 (three) times a day for 7 days       Objective     /82 (BP Location: Left arm, Patient Position: Sitting, Cuff Size: Adult)   Pulse 92   Resp 16   Ht 5' 7" (1.702 m)   Wt 73 kg (161 lb)   BMI 25.22 kg/m²     Physical Exam  Vitals and nursing note reviewed. Constitutional:       General: He is not in acute distress. Appearance: Normal appearance. He is well-developed. He is not ill-appearing. HENT:      Head: Normocephalic and atraumatic. Right Ear: Tympanic membrane, ear canal and external ear normal.      Left Ear: Tympanic membrane, ear canal and external ear normal.      Nose: Nose normal. No congestion or rhinorrhea. Mouth/Throat:      Mouth: Mucous membranes are moist.   Eyes:      General: Lids are normal.      Extraocular Movements: Extraocular movements intact. Conjunctiva/sclera: Conjunctivae normal.      Pupils: Pupils are equal, round, and reactive to light. Neck:      Thyroid: No thyromegaly. Vascular: No carotid bruit. Cardiovascular:      Rate and Rhythm: Normal rate and regular rhythm. Pulses: Normal pulses. Heart sounds: Normal heart sounds, S1 normal and S2 normal. No murmur heard. Pulmonary:      Effort: Pulmonary effort is normal. No respiratory distress. Breath sounds: Normal breath sounds. No wheezing or rales. Abdominal:      General: Bowel sounds are normal.      Palpations: Abdomen is soft. There is no mass. Tenderness: There is no abdominal tenderness. Musculoskeletal:         General: Normal range of motion. Cervical back: Normal range of motion and neck supple. Lymphadenopathy:      Cervical: No cervical adenopathy. Skin:     General: Skin is warm and dry. Coloration: Skin is not pale. Findings: No rash. Neurological:      Mental Status: He is alert and oriented to person, place, and time. Cranial Nerves: No cranial nerve deficit. Sensory: No sensory deficit. Deep Tendon Reflexes: Reflexes are normal and symmetric. Psychiatric:         Behavior: Behavior normal. Behavior is cooperative. Thought Content:  Thought content normal.         Judgment: Judgment normal.       Gregg Solis MD

## 2023-10-19 DIAGNOSIS — I10 ESSENTIAL HYPERTENSION: ICD-10-CM

## 2023-10-19 RX ORDER — LOSARTAN POTASSIUM 100 MG/1
100 TABLET ORAL DAILY
Qty: 90 TABLET | Refills: 0 | Status: SHIPPED | OUTPATIENT
Start: 2023-10-19

## 2024-01-17 DIAGNOSIS — I10 ESSENTIAL HYPERTENSION: ICD-10-CM

## 2024-01-17 RX ORDER — LOSARTAN POTASSIUM 100 MG/1
100 TABLET ORAL DAILY
Qty: 90 TABLET | Refills: 1 | Status: SHIPPED | OUTPATIENT
Start: 2024-01-17

## 2024-01-24 ENCOUNTER — TELEPHONE (OUTPATIENT)
Age: 54
End: 2024-01-24

## 2024-02-15 ENCOUNTER — OFFICE VISIT (OUTPATIENT)
Dept: URGENT CARE | Facility: CLINIC | Age: 54
End: 2024-02-15
Payer: COMMERCIAL

## 2024-02-15 VITALS
DIASTOLIC BLOOD PRESSURE: 74 MMHG | OXYGEN SATURATION: 98 % | SYSTOLIC BLOOD PRESSURE: 140 MMHG | HEART RATE: 115 BPM | TEMPERATURE: 97.5 F | BODY MASS INDEX: 25.26 KG/M2 | RESPIRATION RATE: 16 BRPM | WEIGHT: 161.25 LBS

## 2024-02-15 DIAGNOSIS — J01.10 ACUTE NON-RECURRENT FRONTAL SINUSITIS: Primary | ICD-10-CM

## 2024-02-15 PROCEDURE — 99213 OFFICE O/P EST LOW 20 MIN: CPT | Performed by: PHYSICAL MEDICINE & REHABILITATION

## 2024-02-15 RX ORDER — AMOXICILLIN AND CLAVULANATE POTASSIUM 875; 125 MG/1; MG/1
1 TABLET, FILM COATED ORAL EVERY 12 HOURS SCHEDULED
Qty: 14 TABLET | Refills: 0 | Status: SHIPPED | OUTPATIENT
Start: 2024-02-15 | End: 2024-02-22

## 2024-02-15 NOTE — PROGRESS NOTES
[facility]        NAME: Jeovany Paul is a 53 y.o. male  : 1970    MRN: [id number]  DATE: February 15, 2024  TIME: 11:42 AM    Assessment and Plan   Acute non-recurrent frontal sinusitis [J01.10]  1. Acute non-recurrent frontal sinusitis  amoxicillin-clavulanate (AUGMENTIN) 875-125 mg per tablet            Patient Instructions       Follow up with PCP in 3-5 days.  Proceed to  ER if symptoms worsen.    Chief Complaint     Chief Complaint   Patient presents with    Facial Pain     1 weeks, chest congestion, coughing, sore ears, sore throat. Sinus pressure, pressure behind eye, painful upper jaw. Runny/stuffy nose low grade temp under 101. Taking coracedin and antihistamine for sx.          History of Present Illness       Patient presenting with facial pain, chest congestion, cough, sore ears, sore throat and sinus pressure behind the eye/upper jaw, runny/stuffy nose with low grade fever for approximately one week. States he has been taking Coracidin and antihistamin for symptoms without relief.        Review of Systems   Review of Systems   Constitutional:  Positive for chills and fever.   HENT:  Positive for congestion, rhinorrhea, sinus pressure and sinus pain.    Respiratory:  Positive for cough. Negative for shortness of breath.    Cardiovascular: Negative.  Negative for chest pain.   Gastrointestinal: Negative.  Negative for diarrhea, nausea and vomiting.   Musculoskeletal:  Positive for myalgias.   Neurological: Negative.          Current Medications       Current Outpatient Medications:     amoxicillin-clavulanate (AUGMENTIN) 875-125 mg per tablet, Take 1 tablet by mouth every 12 (twelve) hours for 7 days, Disp: 14 tablet, Rfl: 0    losartan (COZAAR) 100 MG tablet, take 1 tablet by mouth once daily, Disp: 90 tablet, Rfl: 1    omeprazole (PriLOSEC) 20 mg delayed release capsule, Take 1 capsule by mouth daily, Disp: , Rfl:     rosuvastatin (CRESTOR) 5 mg tablet, Take 1 tablet (5 mg total) by  mouth daily, Disp: 90 tablet, Rfl: 3    triamterene-hydrochlorothiazide (MAXZIDE-25) 37.5-25 mg per tablet, take 1 tablet by mouth once daily, Disp: 90 tablet, Rfl: 1    Current Allergies     Allergies as of 02/15/2024 - Reviewed 02/15/2024   Allergen Reaction Noted    Sulfa antibiotics  10/31/2016            The following portions of the patient's history were reviewed and updated as appropriate: allergies, current medications, past family history, past medical history, past social history, past surgical history and problem list.     Past Medical History:   Diagnosis Date    Chronic kidney disease     Episode of Reduced GFR 2017    GERD (gastroesophageal reflux disease)     Hypertension     CORY (obstructive sleep apnea)     Sleep apnea        Past Surgical History:   Procedure Laterality Date    CERVICAL DISC SURGERY      TONSILLECTOMY         Family History   Problem Relation Age of Onset    Breast cancer Mother     COPD Father     Alcohol abuse Father     Thyroid cancer Sister     Multiple sclerosis Sister     Alcohol abuse Brother     No Known Problems Son     Hypertension Family         Benign essential hypertension     Other Family     Heart disease Family     Lung disease Family     Cancer Family          Medications have been verified.        Objective   /74   Pulse (!) 115   Temp 97.5 °F (36.4 °C)   Resp 16   Wt 73.1 kg (161 lb 4 oz)   SpO2 98%   BMI 25.26 kg/m²        Physical Exam     Physical Exam  Constitutional:       General: He is not in acute distress.     Appearance: He is ill-appearing.   HENT:      Right Ear: Tympanic membrane normal.      Left Ear: Tympanic membrane normal.      Nose: Congestion present. No rhinorrhea.      Mouth/Throat:      Mouth: Mucous membranes are moist.      Pharynx: Oropharynx is clear. Posterior oropharyngeal erythema present. No oropharyngeal exudate.   Eyes:      Conjunctiva/sclera: Conjunctivae normal.   Cardiovascular:      Rate and Rhythm: Normal rate  and regular rhythm.      Heart sounds: Normal heart sounds.   Pulmonary:      Effort: Pulmonary effort is normal. No respiratory distress.      Breath sounds: Normal breath sounds. No wheezing, rhonchi or rales.   Musculoskeletal:      Cervical back: Normal range of motion and neck supple.   Lymphadenopathy:      Cervical: No cervical adenopathy.   Skin:     General: Skin is warm.   Neurological:      Mental Status: He is alert.   Psychiatric:         Mood and Affect: Mood normal.         Behavior: Behavior normal.

## 2024-03-06 DIAGNOSIS — E78.2 MIXED HYPERLIPIDEMIA: ICD-10-CM

## 2024-03-06 RX ORDER — ROSUVASTATIN CALCIUM 5 MG/1
5 TABLET, COATED ORAL DAILY
Qty: 90 TABLET | Refills: 1 | Status: SHIPPED | OUTPATIENT
Start: 2024-03-06

## 2024-03-22 DIAGNOSIS — I10 ESSENTIAL HYPERTENSION: ICD-10-CM

## 2024-03-22 RX ORDER — TRIAMTERENE AND HYDROCHLOROTHIAZIDE 37.5; 25 MG/1; MG/1
TABLET ORAL
Qty: 90 TABLET | Refills: 1 | Status: SHIPPED | OUTPATIENT
Start: 2024-03-22

## 2024-03-25 DIAGNOSIS — K76.0 FATTY LIVER DISEASE, NONALCOHOLIC: ICD-10-CM

## 2024-03-25 DIAGNOSIS — Z13.0 SCREENING FOR IRON DEFICIENCY ANEMIA: ICD-10-CM

## 2024-03-25 DIAGNOSIS — N18.31 STAGE 3A CHRONIC KIDNEY DISEASE (HCC): ICD-10-CM

## 2024-03-25 DIAGNOSIS — E78.2 MIXED HYPERLIPIDEMIA: ICD-10-CM

## 2024-03-25 DIAGNOSIS — I10 PRIMARY HYPERTENSION: Primary | ICD-10-CM

## 2024-03-25 DIAGNOSIS — Z13.29 SCREENING FOR THYROID DISORDER: ICD-10-CM

## 2024-03-25 DIAGNOSIS — Z13.1 SCREENING FOR DIABETES MELLITUS: ICD-10-CM

## 2024-03-25 DIAGNOSIS — Z12.5 SCREENING FOR PROSTATE CANCER: ICD-10-CM

## 2024-04-30 ENCOUNTER — APPOINTMENT (OUTPATIENT)
Dept: LAB | Facility: CLINIC | Age: 54
End: 2024-04-30
Payer: COMMERCIAL

## 2024-04-30 DIAGNOSIS — N18.31 STAGE 3A CHRONIC KIDNEY DISEASE (HCC): ICD-10-CM

## 2024-04-30 DIAGNOSIS — Z00.8 HEALTH EXAMINATION IN POPULATION SURVEY: ICD-10-CM

## 2024-04-30 DIAGNOSIS — I10 PRIMARY HYPERTENSION: ICD-10-CM

## 2024-04-30 DIAGNOSIS — K76.0 FATTY LIVER DISEASE, NONALCOHOLIC: ICD-10-CM

## 2024-04-30 DIAGNOSIS — E78.2 MIXED HYPERLIPIDEMIA: ICD-10-CM

## 2024-04-30 DIAGNOSIS — Z13.29 SCREENING FOR THYROID DISORDER: ICD-10-CM

## 2024-04-30 DIAGNOSIS — Z13.0 SCREENING FOR IRON DEFICIENCY ANEMIA: ICD-10-CM

## 2024-04-30 DIAGNOSIS — Z12.5 SCREENING FOR PROSTATE CANCER: ICD-10-CM

## 2024-04-30 DIAGNOSIS — Z13.1 SCREENING FOR DIABETES MELLITUS: ICD-10-CM

## 2024-04-30 LAB
BASOPHILS # BLD AUTO: 0.09 THOUSANDS/ÂΜL (ref 0–0.1)
BASOPHILS NFR BLD AUTO: 2 % (ref 0–1)
CHOLEST SERPL-MCNC: 189 MG/DL
EOSINOPHIL # BLD AUTO: 0.11 THOUSAND/ÂΜL (ref 0–0.61)
EOSINOPHIL NFR BLD AUTO: 2 % (ref 0–6)
ERYTHROCYTE [DISTWIDTH] IN BLOOD BY AUTOMATED COUNT: 11.9 % (ref 11.6–15.1)
EST. AVERAGE GLUCOSE BLD GHB EST-MCNC: 103 MG/DL
HBA1C MFR BLD: 5.2 %
HCT VFR BLD AUTO: 38.7 % (ref 36.5–49.3)
HDLC SERPL-MCNC: 68 MG/DL
HGB BLD-MCNC: 13.3 G/DL (ref 12–17)
IMM GRANULOCYTES # BLD AUTO: 0.04 THOUSAND/UL (ref 0–0.2)
IMM GRANULOCYTES NFR BLD AUTO: 1 % (ref 0–2)
LDLC SERPL CALC-MCNC: 96 MG/DL (ref 0–100)
LYMPHOCYTES # BLD AUTO: 1.37 THOUSANDS/ÂΜL (ref 0.6–4.47)
LYMPHOCYTES NFR BLD AUTO: 22 % (ref 14–44)
MCH RBC QN AUTO: 33.3 PG (ref 26.8–34.3)
MCHC RBC AUTO-ENTMCNC: 34.4 G/DL (ref 31.4–37.4)
MCV RBC AUTO: 97 FL (ref 82–98)
MONOCYTES # BLD AUTO: 0.83 THOUSAND/ÂΜL (ref 0.17–1.22)
MONOCYTES NFR BLD AUTO: 14 % (ref 4–12)
NEUTROPHILS # BLD AUTO: 3.68 THOUSANDS/ÂΜL (ref 1.85–7.62)
NEUTS SEG NFR BLD AUTO: 59 % (ref 43–75)
NRBC BLD AUTO-RTO: 0 /100 WBCS
PLATELET # BLD AUTO: 179 THOUSANDS/UL (ref 149–390)
PMV BLD AUTO: 10.4 FL (ref 8.9–12.7)
PSA SERPL-MCNC: 0.68 NG/ML (ref 0–4)
RBC # BLD AUTO: 4 MILLION/UL (ref 3.88–5.62)
TRIGL SERPL-MCNC: 123 MG/DL
TSH SERPL DL<=0.05 MIU/L-ACNC: 0.63 UIU/ML (ref 0.45–4.5)
WBC # BLD AUTO: 6.12 THOUSAND/UL (ref 4.31–10.16)

## 2024-04-30 PROCEDURE — G0103 PSA SCREENING: HCPCS

## 2024-04-30 PROCEDURE — 85025 COMPLETE CBC W/AUTO DIFF WBC: CPT

## 2024-04-30 PROCEDURE — 80061 LIPID PANEL: CPT

## 2024-04-30 PROCEDURE — 80053 COMPREHEN METABOLIC PANEL: CPT

## 2024-04-30 PROCEDURE — 83036 HEMOGLOBIN GLYCOSYLATED A1C: CPT

## 2024-04-30 PROCEDURE — 36415 COLL VENOUS BLD VENIPUNCTURE: CPT

## 2024-04-30 PROCEDURE — 84443 ASSAY THYROID STIM HORMONE: CPT

## 2024-05-01 ENCOUNTER — TELEPHONE (OUTPATIENT)
Dept: NEPHROLOGY | Facility: CLINIC | Age: 54
End: 2024-05-01

## 2024-05-01 DIAGNOSIS — N18.32 STAGE 3B CHRONIC KIDNEY DISEASE (HCC): Primary | ICD-10-CM

## 2024-05-01 NOTE — TELEPHONE ENCOUNTER
"I tried to call the patient about scheduling his Nephrology Consult appointment ref by Dr. Darrell Aaron for Stage 3b Chronic Kidney Disease, but when I dialed the number we have in the system for the patient I received a message that said \"THE PERSON YOU ARE TRYING TO REACH HAS A VOICE MAIL BOX THAT IS FULL\", and I was not able to leave a message.   "

## 2024-05-02 ENCOUNTER — TELEPHONE (OUTPATIENT)
Dept: NEPHROLOGY | Facility: CLINIC | Age: 54
End: 2024-05-02

## 2024-05-02 NOTE — TELEPHONE ENCOUNTER
"I tried to call patient about scheduling a nephrology Consult appointment ref by Dr. Darrell Aaron for Stage 3b chronic kidney disease, but when I dial the number we have in the system for the patient I received a message that says\"THE NUMBER YOU ARE TRYING TO CALL A VOICE MAIL BOX THAT IS FULL\". I was not able to leave a message and a letter was mailed out to the patient for a reminder to give our office a call.   "

## 2024-05-08 LAB
ALBUMIN SERPL BCP-MCNC: 4.8 G/DL (ref 3.5–5)
ALP SERPL-CCNC: 53 U/L (ref 34–104)
ALT SERPL W P-5'-P-CCNC: 17 U/L (ref 7–52)
ANION GAP SERPL CALCULATED.3IONS-SCNC: 9 MMOL/L (ref 4–13)
AST SERPL W P-5'-P-CCNC: 22 U/L (ref 13–39)
BILIRUB SERPL-MCNC: 0.67 MG/DL (ref 0.2–1)
BUN SERPL-MCNC: 22 MG/DL (ref 5–25)
CALCIUM SERPL-MCNC: 9.5 MG/DL (ref 8.4–10.2)
CHLORIDE SERPL-SCNC: 100 MMOL/L (ref 96–108)
CO2 SERPL-SCNC: 28 MMOL/L (ref 21–32)
CREAT SERPL-MCNC: 1.69 MG/DL (ref 0.6–1.3)
GFR SERPL CREATININE-BSD FRML MDRD: 45 ML/MIN/1.73SQ M
GLUCOSE P FAST SERPL-MCNC: 104 MG/DL (ref 65–99)
POTASSIUM SERPL-SCNC: 4.4 MMOL/L (ref 3.5–5.3)
PROT SERPL-MCNC: 7.2 G/DL (ref 6.4–8.4)
SODIUM SERPL-SCNC: 137 MMOL/L (ref 135–147)

## 2024-05-24 ENCOUNTER — OFFICE VISIT (OUTPATIENT)
Age: 54
End: 2024-05-24
Payer: COMMERCIAL

## 2024-05-24 VITALS
HEART RATE: 88 BPM | BODY MASS INDEX: 25.58 KG/M2 | OXYGEN SATURATION: 100 % | TEMPERATURE: 98.3 F | DIASTOLIC BLOOD PRESSURE: 79 MMHG | WEIGHT: 163 LBS | HEIGHT: 67 IN | SYSTOLIC BLOOD PRESSURE: 132 MMHG | RESPIRATION RATE: 16 BRPM

## 2024-05-24 DIAGNOSIS — G47.33 OSA (OBSTRUCTIVE SLEEP APNEA): Primary | ICD-10-CM

## 2024-05-24 DIAGNOSIS — I10 PRIMARY HYPERTENSION: ICD-10-CM

## 2024-05-24 PROCEDURE — 99213 OFFICE O/P EST LOW 20 MIN: CPT | Performed by: PHYSICIAN ASSISTANT

## 2024-05-26 NOTE — PROGRESS NOTES
"Assessment/Plan:   Diagnoses and all orders for this visit:    CORY (obstructive sleep apnea)    Primary hypertension        Patient is here today for follow up. He continues on his CPAP.   Reviewed compliance and he is using it on a nightly basis.   Residual AHI of 11 with significant air leak.   He does tend to use masks longer than the recommended 3 months which may cause more air leak. He will switch his mask.   He is already on an auto setting.   Will review compliance in another 4 weeks.     He will follow up with us in 1 year or sooner if necessary.     Return in about 1 year (around 5/24/2025).  All questions are answered to the patient's satisfaction and understanding.  He verbalizes understanding.  He is encouraged to call with any further questions or concerns.    Portions of the record may have been created with voice recognition software.  Occasional wrong word or \"sound a like\" substitutions may have occurred due to the inherent limitations of voice recognition software.  Read the chart carefully and recognize, using context, where substitutions have occurred.    Electronically Signed by Domenic Ruiz PA-C    ______________________________________________________________________    Chief Complaint:   Chief Complaint   Patient presents with    Follow-up       Patient ID: Jeovany is a 54 y.o. y.o. male has a past medical history of Chronic kidney disease, GERD (gastroesophageal reflux disease), Hypertension, CORY (obstructive sleep apnea), and Sleep apnea.    5/24/2024  Patient presents today for follow-up visit.  Patient is a 53-year-old male past medical history hypertension, GERD, severe sleep apnea on CPAP.    He is here today for follow up. He continues to do well with his CPAP. He notes better sleep and less daytime sleepiness with the CPAP. He did spend a week of vacation without it as he forgot a piece and did note worsening daytime symptoms and sleep during that time.     primary " symptoms  Pertinent negatives include no chest pain, fever, headaches, myalgias or sore throat.       Review of Systems   Constitutional: Negative.  Negative for appetite change and fever.   HENT: Negative.  Negative for ear pain, postnasal drip, rhinorrhea, sneezing, sore throat and trouble swallowing.    Respiratory: Negative.     Cardiovascular: Negative.  Negative for chest pain.   Gastrointestinal: Negative.    Genitourinary: Negative.    Musculoskeletal: Negative.  Negative for myalgias.   Skin: Negative.    Allergic/Immunologic: Negative.    Neurological: Negative.  Negative for headaches.   Psychiatric/Behavioral: Negative.         Smoking history: He reports that he quit smoking about 5 years ago. His smoking use included cigarettes. He started smoking about 30 years ago. He has a 25.2 pack-year smoking history. He has never used smokeless tobacco.    The following portions of the patient's history were reviewed and updated as appropriate: allergies, current medications, past family history, past medical history, past social history, past surgical history, and problem list.    Immunization History   Administered Date(s) Administered    COVID-19 MODERNA VACC 0.25 ML IM BOOSTER 11/10/2021    COVID-19 MODERNA VACC 0.5 ML IM 02/04/2021, 03/03/2021, 11/10/2021, 09/07/2022    INFLUENZA 10/01/2014, 10/14/2015, 10/05/2016, 09/19/2017, 09/25/2018, 09/01/2021    Influenza, injectable, quadrivalent, preservative free 0.5 mL 09/20/2019, 09/04/2020    Influenza, seasonal, injectable 10/28/2013     Current Outpatient Medications   Medication Sig Dispense Refill    losartan (COZAAR) 100 MG tablet take 1 tablet by mouth once daily 90 tablet 1    omeprazole (PriLOSEC) 20 mg delayed release capsule Take 1 capsule by mouth daily      rosuvastatin (CRESTOR) 5 mg tablet take 1 tablet by mouth once daily 90 tablet 1    triamterene-hydrochlorothiazide (MAXZIDE-25) 37.5-25 mg per tablet take 1 tablet by mouth once daily 90 tablet  "1     No current facility-administered medications for this visit.     Allergies: Sulfa antibiotics    Objective:  Vitals:    05/24/24 1507 05/24/24 1508   BP: 132/79    BP Location: Left arm    Patient Position: Sitting    Cuff Size: Large    Pulse: 88    Resp: 16    Temp: 98.3 °F (36.8 °C)    SpO2: 100% 100%   Weight: 73.9 kg (163 lb)    Height: 5' 7\" (1.702 m)    Oxygen Therapy  SpO2: 100 %  Oxygen Therapy: None (Room air)  .  Wt Readings from Last 3 Encounters:   05/24/24 73.9 kg (163 lb)   02/15/24 73.1 kg (161 lb 4 oz)   10/03/23 73 kg (161 lb)     Body mass index is 25.53 kg/m².    Physical Exam  Constitutional:       General: He is not in acute distress.     Appearance: Normal appearance. He is well-developed. He is not ill-appearing.   HENT:      Head: Normocephalic and atraumatic.      Mouth/Throat:      Pharynx: Oropharynx is clear.   Eyes:      Pupils: Pupils are equal, round, and reactive to light.   Cardiovascular:      Rate and Rhythm: Normal rate and regular rhythm.   Pulmonary:      Effort: Pulmonary effort is normal. No respiratory distress.      Breath sounds: Normal breath sounds. No decreased breath sounds, wheezing, rhonchi or rales.   Abdominal:      General: Abdomen is flat. There is no distension.   Musculoskeletal:         General: Normal range of motion.      Cervical back: Normal range of motion.      Right lower leg: No edema.      Left lower leg: No edema.   Skin:     General: Skin is warm and dry.      Findings: No rash.   Neurological:      Mental Status: He is alert and oriented to person, place, and time.   Psychiatric:         Mood and Affect: Mood normal.         Behavior: Behavior normal.         Lab Review:   Lab Results   Component Value Date     (L) 01/08/2016    K 4.4 04/30/2024    K 4.4 01/08/2016     04/30/2024    CL 99 (L) 01/08/2016    CO2 28 04/30/2024    CO2 26 01/08/2016    BUN 22 04/30/2024    BUN 14 01/08/2016    CREATININE 1.69 (H) 04/30/2024    " CREATININE 1.23 01/08/2016    GLUCOSE 90 01/08/2016    CALCIUM 9.5 04/30/2024    CALCIUM 9.0 01/08/2016     Lab Results   Component Value Date    WBC 6.12 04/30/2024    WBC 10.06 01/08/2016    HGB 13.3 04/30/2024    HGB 15.7 01/08/2016    HCT 38.7 04/30/2024    HCT 45.1 01/08/2016    MCV 97 04/30/2024    MCV 97 01/08/2016     04/30/2024     01/08/2016       Diagnostics:  I have personally reviewed pertinent reports.    Reviewed compliance  Office Spirometry Results:     ESS:    No results found.  Answers submitted by the patient for this visit:  Pulmonology Questionnaire (Submitted on 5/23/2024)  Chief Complaint: Primary symptoms  Do you have shortness of breath that occurs with effort or exertion?: No  Do you have ear congestion?: No  Do you have heartburn?: No  Do you have fatigue?: No  Do you have nasal congestion?: No  Do you have shortness of breath when lying flat?: No  Do you have shortness of breath when you wake up?: No  Do you have sweats?: No  Have you experienced weight loss?: No     show

## 2024-06-24 ENCOUNTER — TELEPHONE (OUTPATIENT)
Dept: NEPHROLOGY | Facility: CLINIC | Age: 54
End: 2024-06-24

## 2024-06-24 DIAGNOSIS — N18.31 STAGE 3A CHRONIC KIDNEY DISEASE (HCC): Primary | ICD-10-CM

## 2024-06-24 NOTE — TELEPHONE ENCOUNTER
Called left voice message to remind patient to get non-fasting labs done 1 week prior to 07/12/24 scheduled consult appointment with KATHY Woods.

## 2024-06-25 NOTE — TELEPHONE ENCOUNTER
Appt scheduled for 9/8/21
Detail Level: Detailed
Patient informed and he will call after work today to schedule 
Sent med; due for appt
Quality 226: Preventive Care And Screening: Tobacco Use: Screening And Cessation Intervention: Patient screened for tobacco use and is an ex/non-smoker
Quality 431: Preventive Care And Screening: Unhealthy Alcohol Use - Screening: Patient not identified as an unhealthy alcohol user when screened for unhealthy alcohol use using a systematic screening method

## 2024-07-06 ENCOUNTER — APPOINTMENT (OUTPATIENT)
Dept: LAB | Facility: CLINIC | Age: 54
End: 2024-07-06
Payer: COMMERCIAL

## 2024-07-06 DIAGNOSIS — N18.31 STAGE 3A CHRONIC KIDNEY DISEASE (HCC): ICD-10-CM

## 2024-07-06 LAB
25(OH)D3 SERPL-MCNC: 83.6 NG/ML (ref 30–100)
ANION GAP SERPL CALCULATED.3IONS-SCNC: 13 MMOL/L (ref 4–13)
BACTERIA UR QL AUTO: ABNORMAL /HPF
BASOPHILS # BLD AUTO: 0.08 THOUSANDS/ÂΜL (ref 0–0.1)
BASOPHILS NFR BLD AUTO: 1 % (ref 0–1)
BILIRUB UR QL STRIP: NEGATIVE
BUN SERPL-MCNC: 17 MG/DL (ref 5–25)
CALCIUM SERPL-MCNC: 9.3 MG/DL (ref 8.4–10.2)
CHLORIDE SERPL-SCNC: 96 MMOL/L (ref 96–108)
CLARITY UR: CLEAR
CO2 SERPL-SCNC: 25 MMOL/L (ref 21–32)
COLOR UR: ABNORMAL
CREAT SERPL-MCNC: 1.29 MG/DL (ref 0.6–1.3)
CREAT UR-MCNC: 86.6 MG/DL
EOSINOPHIL # BLD AUTO: 0.12 THOUSAND/ÂΜL (ref 0–0.61)
EOSINOPHIL NFR BLD AUTO: 2 % (ref 0–6)
ERYTHROCYTE [DISTWIDTH] IN BLOOD BY AUTOMATED COUNT: 12 % (ref 11.6–15.1)
GFR SERPL CREATININE-BSD FRML MDRD: 62 ML/MIN/1.73SQ M
GLUCOSE P FAST SERPL-MCNC: 96 MG/DL (ref 65–99)
GLUCOSE UR STRIP-MCNC: NEGATIVE MG/DL
HCT VFR BLD AUTO: 41 % (ref 36.5–49.3)
HGB BLD-MCNC: 14.2 G/DL (ref 12–17)
HGB UR QL STRIP.AUTO: NEGATIVE
IMM GRANULOCYTES # BLD AUTO: 0.07 THOUSAND/UL (ref 0–0.2)
IMM GRANULOCYTES NFR BLD AUTO: 1 % (ref 0–2)
KETONES UR STRIP-MCNC: NEGATIVE MG/DL
LEUKOCYTE ESTERASE UR QL STRIP: NEGATIVE
LYMPHOCYTES # BLD AUTO: 1.36 THOUSANDS/ÂΜL (ref 0.6–4.47)
LYMPHOCYTES NFR BLD AUTO: 23 % (ref 14–44)
MCH RBC QN AUTO: 33.7 PG (ref 26.8–34.3)
MCHC RBC AUTO-ENTMCNC: 34.6 G/DL (ref 31.4–37.4)
MCV RBC AUTO: 97 FL (ref 82–98)
MICROALBUMIN UR-MCNC: 19 MG/L
MICROALBUMIN/CREAT 24H UR: 22 MG/G CREATININE (ref 0–30)
MONOCYTES # BLD AUTO: 0.75 THOUSAND/ÂΜL (ref 0.17–1.22)
MONOCYTES NFR BLD AUTO: 13 % (ref 4–12)
MUCOUS THREADS UR QL AUTO: ABNORMAL
NEUTROPHILS # BLD AUTO: 3.53 THOUSANDS/ÂΜL (ref 1.85–7.62)
NEUTS SEG NFR BLD AUTO: 60 % (ref 43–75)
NITRITE UR QL STRIP: NEGATIVE
NON-SQ EPI CELLS URNS QL MICRO: ABNORMAL /HPF
NRBC BLD AUTO-RTO: 0 /100 WBCS
PH UR STRIP.AUTO: 6.5 [PH]
PHOSPHATE SERPL-MCNC: 2.3 MG/DL (ref 2.7–4.5)
PLATELET # BLD AUTO: 173 THOUSANDS/UL (ref 149–390)
PMV BLD AUTO: 10.8 FL (ref 8.9–12.7)
POTASSIUM SERPL-SCNC: 3.8 MMOL/L (ref 3.5–5.3)
PROT UR STRIP-MCNC: ABNORMAL MG/DL
PTH-INTACT SERPL-MCNC: 44.1 PG/ML (ref 12–88)
RBC # BLD AUTO: 4.21 MILLION/UL (ref 3.88–5.62)
RBC #/AREA URNS AUTO: ABNORMAL /HPF
SODIUM SERPL-SCNC: 134 MMOL/L (ref 135–147)
SP GR UR STRIP.AUTO: 1.01 (ref 1–1.03)
URATE SERPL-MCNC: 7.1 MG/DL (ref 3.5–8.5)
UROBILINOGEN UR STRIP-ACNC: <2 MG/DL
WBC # BLD AUTO: 5.91 THOUSAND/UL (ref 4.31–10.16)
WBC #/AREA URNS AUTO: ABNORMAL /HPF

## 2024-07-06 PROCEDURE — 84550 ASSAY OF BLOOD/URIC ACID: CPT

## 2024-07-06 PROCEDURE — 82570 ASSAY OF URINE CREATININE: CPT

## 2024-07-06 PROCEDURE — 85025 COMPLETE CBC W/AUTO DIFF WBC: CPT

## 2024-07-06 PROCEDURE — 81001 URINALYSIS AUTO W/SCOPE: CPT

## 2024-07-06 PROCEDURE — 82306 VITAMIN D 25 HYDROXY: CPT

## 2024-07-06 PROCEDURE — 83970 ASSAY OF PARATHORMONE: CPT

## 2024-07-06 PROCEDURE — 82043 UR ALBUMIN QUANTITATIVE: CPT

## 2024-07-06 PROCEDURE — 84100 ASSAY OF PHOSPHORUS: CPT

## 2024-07-06 PROCEDURE — 36415 COLL VENOUS BLD VENIPUNCTURE: CPT

## 2024-07-06 PROCEDURE — 80048 BASIC METABOLIC PNL TOTAL CA: CPT

## 2024-07-11 NOTE — PROGRESS NOTES
NEPHROLOGY OFFICE NOTE    Patient: Jeovany Paul               Sex: male           YOB: 1970        Age:  54 y.o.       7/12/2024      BACKGROUND     I had the pleasure of seeing Jeovany Paul in the nephrology office today for consultation. He has a past medical history significant for hypertension, GERD, CORY on CPAP, and chronic kidney disease. He was referred to our office for further work-up and evaluation of chronic kidney disease.     He is following with Pulmonology for management of CORY, currently maintained on CPAP.     He has underlying GERD, currently maintained on omeprazole.    Does report a history of EtOH use but has cut back significantly to about 20 beers weekly.  He does endorse use of over-the-counter supplements such as turmeric, milk thistle, as well as Ashwagandha.  He has recently stopped use of Ashwagandha but was considering resuming.  I advised him that are no clinical trials on this supplement given lack of controlled environment and variability between manufacturers.  Due to this, cannot advise him on safety or efficacy from a nephrology standpoint.    He has a history of hypertension, maintained on losartan and triamterene - hydrochlorothiazide.  Reports blood pressures under good control at home.    The last blood work was done on 7/6/2024, which we have reviewed together.    SUBJECTIVE     He currently has no complaints at this time and is feeling well. Patient denies any chest pain, shortness of breath, or swelling.    REVIEW OF SYSTEMS     Review of Systems   Constitutional:  Negative for activity change, chills, fatigue and fever.   HENT:  Negative for trouble swallowing.    Respiratory:  Negative for shortness of breath.    Cardiovascular:  Negative for leg swelling.   Gastrointestinal:  Negative for nausea and vomiting.   Genitourinary:  Negative for difficulty urinating, dysuria, frequency and hematuria.   Musculoskeletal:  Negative for back pain.   Skin:  Negative  for pallor.   Neurological:  Negative for dizziness, syncope, weakness and light-headedness.   Psychiatric/Behavioral:  Negative for sleep disturbance. The patient is not nervous/anxious.        OBJECTIVE     Current Weight: Weight - Scale: 74.8 kg (165 lb)  Vitals:    07/12/24 1414   BP: 122/74   Pulse: 87   Resp: 18   Temp: 98.1 °F (36.7 °C)   SpO2: 98%     Body mass index is 25.84 kg/m².    CURRENT MEDICATIONS       Current Outpatient Medications:     losartan (COZAAR) 100 MG tablet, take 1 tablet by mouth once daily, Disp: 90 tablet, Rfl: 1    omeprazole (PriLOSEC) 20 mg delayed release capsule, Take 1 capsule by mouth daily, Disp: , Rfl:     rosuvastatin (CRESTOR) 5 mg tablet, take 1 tablet by mouth once daily, Disp: 90 tablet, Rfl: 1    triamterene-hydrochlorothiazide (MAXZIDE-25) 37.5-25 mg per tablet, take 1 tablet by mouth once daily, Disp: 90 tablet, Rfl: 1    PHYSICAL EXAMINATION     Physical Exam  Vitals reviewed.   Constitutional:       General: He is not in acute distress.  HENT:      Head: Normocephalic.      Mouth/Throat:      Lips: Pink.      Mouth: Mucous membranes are moist.   Eyes:      General: Lids are normal. No scleral icterus.  Cardiovascular:      Rate and Rhythm: Normal rate and regular rhythm.      Heart sounds: S1 normal and S2 normal.   Pulmonary:      Effort: Pulmonary effort is normal. No accessory muscle usage or respiratory distress.      Breath sounds: Normal breath sounds.   Abdominal:      General: There is no distension.      Tenderness: There is no abdominal tenderness.   Musculoskeletal:      Cervical back: Normal range of motion and neck supple. No tenderness.      Right lower leg: No edema.      Left lower leg: No edema.   Skin:     General: Skin is warm.      Coloration: Skin is not cyanotic or jaundiced.   Neurological:      General: No focal deficit present.      Mental Status: He is alert and oriented to person, place, and time.   Psychiatric:         Attention and  Perception: Attention normal.         Speech: Speech normal.         Behavior: Behavior is cooperative.           LAB RESULTS     Results from last 7 days   Lab Units 07/06/24  0721   WBC Thousand/uL 5.91   HEMOGLOBIN g/dL 14.2   HEMATOCRIT % 41.0   PLATELETS Thousands/uL 173   SODIUM mmol/L 134*   POTASSIUM mmol/L 3.8   CHLORIDE mmol/L 96   CO2 mmol/L 25   BUN mg/dL 17   CREATININE mg/dL 1.29   EGFR ml/min/1.73sq m 62   CALCIUM mg/dL 9.3   PHOSPHORUS mg/dL 2.3*         RADIOLOGY RESULTS      No results found for this or any previous visit.    Results for orders placed during the hospital encounter of 11/17/20    XR chest pa & lateral    Narrative  CHEST    INDICATION:   cp.    COMPARISON:  Two-view chest 7/23/2020    EXAM PERFORMED/VIEWS:  XR CHEST PA & LATERAL      FINDINGS:  Lower cervical spine fixation hardware.    Cardiomediastinal silhouette appears unremarkable.    The lungs are clear.  No pneumothorax or pleural effusion.    Osseous structures appear within normal limits for patient age.    Impression  No acute cardiopulmonary disease.            Workstation performed: [id number]    CT Abdomen and Pelvis 11/17/2020:  FINDINGS:     ABDOMEN     LOWER CHEST:  No clinically significant abnormality identified in the visualized lower chest.     LIVER/BILIARY TREE:  Mild to moderate steatosis.     GALLBLADDER:  No calcified gallstones. No pericholecystic inflammatory change.     SPLEEN:  Unremarkable.     PANCREAS:  Unremarkable.     ADRENAL GLANDS:  Unremarkable.     KIDNEYS/URETERS:  Unremarkable. No hydronephrosis.     STOMACH AND BOWEL:  Unremarkable.     APPENDIX:  No findings to suggest appendicitis.     ABDOMINOPELVIC CAVITY:  No ascites.  No pneumoperitoneum.  No lymphadenopathy.     VESSELS:  Unremarkable for patient's age.     PELVIS     REPRODUCTIVE ORGANS:  Unremarkable for patient's age.     URINARY BLADDER:  Unremarkable.     ABDOMINAL WALL/INGUINAL REGIONS:  Unremarkable.     OSSEOUS STRUCTURES:  No  "acute fracture or destructive osseous lesion.     IMPRESSION:     No acute abnormality identified.     Hepatic steatosis.      ASSESSMENT/PLAN     Chronic Kidney Disease 2/3a:  After review of the medical record, it appears baseline creatinine level fluctuates around 1.3 - 1.6 dating back through September of 2021. Suspect etiology of chronic kidney disease multifactorial in the setting of hypertensive nephrosclerosis.    Current creatinine level is 1.29 with an eGFR of 62. Urinalysis showed no significant hematuria, urine microalbumin to creatinine ratio within normal limits. CT imaging of the abdomen obtained on 11/17/2020 noted kidneys were unremarkable with no evidence of hydronephrosis.     I suspect etiology of CKD multifactorial in the setting of hypertensive nephrosclerosis.  As patient clinically appears euvolemic advise hydration with up to 2 L of water daily and avoidance of nephrotoxic agents such as NSAIDs.  Advised reduction in EtOH use. Will check CKD labs prior to follow-up.    Hypertension:  Currently maintained on losartan 100 mg daily and triamterene - hydrochlorothiazide 27.5 - 25 mg daily.  Blood pressure within acceptable range on the current regimen.      Thank you for the consultation to assist in the care of Jeovany Paul. We will continue to follow the patient with you. Recommend Nephrology follow-up in 3-4 months.    KATHY King  Nephrology  7/12/2024      Portions of the record may have been created with voice recognition software. Occasional wrong word or \"sound a like\" substitutions may have occurred due to the inherent limitations of voice recognition software. Read the chart carefully and recognize, using context, where substitutions have occurred.   "

## 2024-07-12 ENCOUNTER — CONSULT (OUTPATIENT)
Dept: NEPHROLOGY | Facility: CLINIC | Age: 54
End: 2024-07-12
Payer: COMMERCIAL

## 2024-07-12 VITALS
RESPIRATION RATE: 18 BRPM | DIASTOLIC BLOOD PRESSURE: 74 MMHG | WEIGHT: 165 LBS | TEMPERATURE: 98.1 F | BODY MASS INDEX: 25.9 KG/M2 | SYSTOLIC BLOOD PRESSURE: 122 MMHG | HEIGHT: 67 IN | OXYGEN SATURATION: 98 % | HEART RATE: 87 BPM

## 2024-07-12 DIAGNOSIS — N18.32 STAGE 3B CHRONIC KIDNEY DISEASE (HCC): ICD-10-CM

## 2024-07-12 DIAGNOSIS — I10 PRIMARY HYPERTENSION: ICD-10-CM

## 2024-07-12 DIAGNOSIS — N18.31 STAGE 3A CHRONIC KIDNEY DISEASE (HCC): Primary | ICD-10-CM

## 2024-07-12 PROCEDURE — 99204 OFFICE O/P NEW MOD 45 MIN: CPT

## 2024-07-12 NOTE — LETTER
July 12, 2024     Darrell Aaron MD    Patient: Jeovany Paul   YOB: 1970   Date of Visit: 7/12/2024       Dear Dr. Aaron:    Thank you for referring Jeovany Paul to me for evaluation. Below are my notes for this consultation.    If you have questions, please do not hesitate to call me. I look forward to following your patient along with you.         Sincerely,        KATHY King        CC: No Recipients    KATHY King  7/12/2024  4:46 PM  Sign when Signing Visit  NEPHROLOGY OFFICE NOTE    Patient: Jeovany Paul               Sex: male           YOB: 1970        Age:  54 y.o.       7/12/2024      BACKGROUND     I had the pleasure of seeing Jeovany Paul in the nephrology office today for consultation. He has a past medical history significant for hypertension, GERD, CORY on CPAP, and chronic kidney disease. He was referred to our office for further work-up and evaluation of chronic kidney disease.     He is following with Pulmonology for management of CORY, currently maintained on CPAP.     He has underlying GERD, currently maintained on omeprazole.    Does report a history of EtOH use but has cut back significantly to about 20 beers weekly.  He does endorse use of over-the-counter supplements such as turmeric, milk thistle, as well as Ashwagandha.  He has recently stopped use of Ashwagandha but was considering resuming.  I advised him that are no clinical trials on this supplement given lack of controlled environment and variability between manufacturers.  Due to this, cannot advise him on safety or efficacy from a nephrology standpoint.    He has a history of hypertension, maintained on losartan and triamterene - hydrochlorothiazide.  Reports blood pressures under good control at home.    The last blood work was done on 7/6/2024, which we have reviewed together.    SUBJECTIVE     He currently has no complaints at this time and is feeling well. Patient denies  any chest pain, shortness of breath, or swelling.    REVIEW OF SYSTEMS     Review of Systems   Constitutional:  Negative for activity change, chills, fatigue and fever.   HENT:  Negative for trouble swallowing.    Respiratory:  Negative for shortness of breath.    Cardiovascular:  Negative for leg swelling.   Gastrointestinal:  Negative for nausea and vomiting.   Genitourinary:  Negative for difficulty urinating, dysuria, frequency and hematuria.   Musculoskeletal:  Negative for back pain.   Skin:  Negative for pallor.   Neurological:  Negative for dizziness, syncope, weakness and light-headedness.   Psychiatric/Behavioral:  Negative for sleep disturbance. The patient is not nervous/anxious.        OBJECTIVE     Current Weight: Weight - Scale: 74.8 kg (165 lb)  Vitals:    07/12/24 1414   BP: 122/74   Pulse: 87   Resp: 18   Temp: 98.1 °F (36.7 °C)   SpO2: 98%     Body mass index is 25.84 kg/m².    CURRENT MEDICATIONS       Current Outpatient Medications:   •  losartan (COZAAR) 100 MG tablet, take 1 tablet by mouth once daily, Disp: 90 tablet, Rfl: 1  •  omeprazole (PriLOSEC) 20 mg delayed release capsule, Take 1 capsule by mouth daily, Disp: , Rfl:   •  rosuvastatin (CRESTOR) 5 mg tablet, take 1 tablet by mouth once daily, Disp: 90 tablet, Rfl: 1  •  triamterene-hydrochlorothiazide (MAXZIDE-25) 37.5-25 mg per tablet, take 1 tablet by mouth once daily, Disp: 90 tablet, Rfl: 1    PHYSICAL EXAMINATION     Physical Exam  Vitals reviewed.   Constitutional:       General: He is not in acute distress.  HENT:      Head: Normocephalic.      Mouth/Throat:      Lips: Pink.      Mouth: Mucous membranes are moist.   Eyes:      General: Lids are normal. No scleral icterus.  Cardiovascular:      Rate and Rhythm: Normal rate and regular rhythm.      Heart sounds: S1 normal and S2 normal.   Pulmonary:      Effort: Pulmonary effort is normal. No accessory muscle usage or respiratory distress.      Breath sounds: Normal breath sounds.    Abdominal:      General: There is no distension.      Tenderness: There is no abdominal tenderness.   Musculoskeletal:      Cervical back: Normal range of motion and neck supple. No tenderness.      Right lower leg: No edema.      Left lower leg: No edema.   Skin:     General: Skin is warm.      Coloration: Skin is not cyanotic or jaundiced.   Neurological:      General: No focal deficit present.      Mental Status: He is alert and oriented to person, place, and time.   Psychiatric:         Attention and Perception: Attention normal.         Speech: Speech normal.         Behavior: Behavior is cooperative.           LAB RESULTS     Results from last 7 days   Lab Units 07/06/24  0721   WBC Thousand/uL 5.91   HEMOGLOBIN g/dL 14.2   HEMATOCRIT % 41.0   PLATELETS Thousands/uL 173   SODIUM mmol/L 134*   POTASSIUM mmol/L 3.8   CHLORIDE mmol/L 96   CO2 mmol/L 25   BUN mg/dL 17   CREATININE mg/dL 1.29   EGFR ml/min/1.73sq m 62   CALCIUM mg/dL 9.3   PHOSPHORUS mg/dL 2.3*         RADIOLOGY RESULTS      No results found for this or any previous visit.    Results for orders placed during the hospital encounter of 11/17/20    XR chest pa & lateral    Narrative  CHEST    INDICATION:   cp.    COMPARISON:  Two-view chest 7/23/2020    EXAM PERFORMED/VIEWS:  XR CHEST PA & LATERAL      FINDINGS:  Lower cervical spine fixation hardware.    Cardiomediastinal silhouette appears unremarkable.    The lungs are clear.  No pneumothorax or pleural effusion.    Osseous structures appear within normal limits for patient age.    Impression  No acute cardiopulmonary disease.            Workstation performed: [id number]    CT Abdomen and Pelvis 11/17/2020:  FINDINGS:     ABDOMEN     LOWER CHEST:  No clinically significant abnormality identified in the visualized lower chest.     LIVER/BILIARY TREE:  Mild to moderate steatosis.     GALLBLADDER:  No calcified gallstones. No pericholecystic inflammatory change.     SPLEEN:  Unremarkable.      PANCREAS:  Unremarkable.     ADRENAL GLANDS:  Unremarkable.     KIDNEYS/URETERS:  Unremarkable. No hydronephrosis.     STOMACH AND BOWEL:  Unremarkable.     APPENDIX:  No findings to suggest appendicitis.     ABDOMINOPELVIC CAVITY:  No ascites.  No pneumoperitoneum.  No lymphadenopathy.     VESSELS:  Unremarkable for patient's age.     PELVIS     REPRODUCTIVE ORGANS:  Unremarkable for patient's age.     URINARY BLADDER:  Unremarkable.     ABDOMINAL WALL/INGUINAL REGIONS:  Unremarkable.     OSSEOUS STRUCTURES:  No acute fracture or destructive osseous lesion.     IMPRESSION:     No acute abnormality identified.     Hepatic steatosis.      ASSESSMENT/PLAN     Chronic Kidney Disease 2/3a:  After review of the medical record, it appears baseline creatinine level fluctuates around 1.3 - 1.6 dating back through September of 2021. Suspect etiology of chronic kidney disease multifactorial in the setting of hypertensive nephrosclerosis.    Current creatinine level is 1.29 with an eGFR of 62. Urinalysis showed no significant hematuria, urine microalbumin to creatinine ratio within normal limits. CT imaging of the abdomen obtained on 11/17/2020 noted kidneys were unremarkable with no evidence of hydronephrosis.     I suspect etiology of CKD multifactorial in the setting of hypertensive nephrosclerosis.  As patient clinically appears euvolemic advise hydration with up to 2 L of water daily and avoidance of nephrotoxic agents such as NSAIDs.  Advised reduction in EtOH use. Will check CKD labs prior to follow-up.    Hypertension:  Currently maintained on losartan 100 mg daily and triamterene - hydrochlorothiazide 27.5 - 25 mg daily.  Blood pressure within acceptable range on the current regimen.      Thank you for the consultation to assist in the care of Jeovany Paul. We will continue to follow the patient with you. Recommend Nephrology follow-up in 3-4 months.    Raya Harden,  "KATHY  Nephrology  7/12/2024      Portions of the record may have been created with voice recognition software. Occasional wrong word or \"sound a like\" substitutions may have occurred due to the inherent limitations of voice recognition software. Read the chart carefully and recognize, using context, where substitutions have occurred.   "

## 2024-07-12 NOTE — PATIENT INSTRUCTIONS
"Drink up to 2L of water daily.  Avoid use of NSAIDs (e.g., Ibuprofen, Motrin, Aleve, Naproxen, Advil)    Patient Education     Chronic kidney disease   The Basics   Written by the doctors and editors at [facility]   What is chronic kidney disease? -- Chronic kidney disease, or \"CKD,\" is when the kidneys stop working as well as they should. When they are working normally, the kidneys filter blood and remove waste and excess salt and water (figure 1).  In people with CKD, the kidneys slowly lose the ability to filter blood. In time, the kidneys can stop working completely. That is why it is so important to keep CKD from getting worse.  What are the symptoms of CKD? -- At first, CKD causes no symptoms. As the disease gets worse, it can:   Make your feet, ankles, or legs swell (called \"edema\")   Give you high blood pressure   Make you very tired   Damage your bones  Will I need tests? -- Yes. Your doctor will want to see you regularly. You will probably have appointments at least once a year, and you will get regular tests to check your kidneys. These include blood and urine tests.  If your CKD gets worse over time, you will probably need to see a \"nephrologist.\" This is a doctor who takes care of people with kidney disease.  Is there anything I can do to keep my kidneys from getting worse? -- Yes. If you have CKD, you can protect your kidneys if you:   Take all of your prescribed medicines every day, and follow all of your doctor's instructions for how to take them.   Keep your blood sugar in a healthy range, if you have diabetes.   Change your diet, if your doctor or nurse recommends to. They might suggest working with a dietitian (nutrition expert).   Quit smoking, if you smoke.   Lose weight, if you have excess body weight.   Avoid medicines that can harm the kidneys - One example is nonsteroidal antiinflammatory drugs (\"NSAIDs\"). These medicines include ibuprofen (sample brand names: Advil, Motrin) and naproxen " "(sample brand name: Aleve). There are other medicines that people with CKD need to avoid, too. Check with your doctor, nurse, or kidney specialist before starting any new medicines or supplements, even those you can buy without a prescription.  How is CKD treated? -- People in the early stages of CKD can take medicines to keep the disease from getting worse. For example, many people with CKD should take medicines known as \"ACE inhibitors\" or \"angiotensin receptor blockers.\" If your doctor or nurse prescribes these medicines, it is very important that you take them every day as directed. If they cause side effects or cost too much, tell your doctor or nurse. They might have solutions to offer.  What happens if my kidneys stop working completely? -- If your kidneys can no longer filter blood properly, you can choose between 3 different treatments to take over their job. Your choices are:   Kidney transplant - After transplant surgery, the new kidney can do the job of your own kidneys. If you have a kidney transplant, you will need to take medicines for the rest of your life to keep your body from reacting badly to the new kidney. (You only need 1 kidney to live.)   Hemodialysis - This is a procedure in which a dialysis machine takes over the job of the kidneys. The machine pumps blood out of the body, filters it, and returns it to the body. If you choose hemodialysis, you will need to be hooked up to the machine at least 3 times a week for several hours for the rest of your life. Before you start, you will also need to have surgery to prepare a blood vessel for attachment to the machine.   Peritoneal dialysis - This involves piping a special fluid into the belly every day. If you choose peritoneal dialysis, you will need surgery to have a tube implanted in your belly. Then, you will have to learn how to pipe the fluid in and out through that tube.  How do I choose between the different treatment options? -- You and " your doctor will need to work together to find a treatment that's right for you. Kidney transplant surgery is usually the best option for most people. But often, there are no kidneys available for transplant.  Ask your doctor to explain all of your options and how they might work for you. Then, talk openly with them about how you feel about all of the options. You might even decide that you do not want any treatment. That is your choice.  All topics are updated as new evidence becomes available and our peer review process is complete.  This topic retrieved from [employer] on: May 18, 2024.  Topic 34635 Version 34.0  Release: 32.4.3 - C32.137  © 2024 UpToDate, Inc. and/or its affiliates. All rights reserved.  figure 1: Anatomy of the urinary tract     Urine is made by the kidneys. It passes from the kidneys into the bladder through 2 tubes called the ureters. Then, it leaves the bladder through another tube called the urethra.  Graphic 51779 Version 8.0  Consumer Information Use and Disclaimer   Disclaimer: This generalized information is a limited summary of diagnosis, treatment, and/or medication information. It is not meant to be comprehensive and should be used as a tool to help the user understand and/or assess potential diagnostic and treatment options. It does NOT include all information about conditions, treatments, medications, side effects, or risks that may apply to a specific patient. It is not intended to be medical advice or a substitute for the medical advice, diagnosis, or treatment of a health care provider based on the health care provider's examination and assessment of a patient's specific and unique circumstances. Patients must speak with a health care provider for complete information about their health, medical questions, and treatment options, including any risks or benefits regarding use of medications. This information does not endorse any treatments or medications as safe, effective, or  approved for treating a specific patient. UpToDate, Inc. and its affiliates disclaim any warranty or liability relating to this information or the use thereof.The use of this information is governed by the Terms of Use, available at https://www.wolAutogriduwer.com/en/know/clinical-effectiveness-terms. 2024© [employer] and its affiliates and/or licensors. All rights reserved.  Copyright   © 2024 [employer] and/or its affiliates. All rights reserved.

## 2024-07-17 DIAGNOSIS — I10 ESSENTIAL HYPERTENSION: ICD-10-CM

## 2024-07-19 RX ORDER — LOSARTAN POTASSIUM 100 MG/1
100 TABLET ORAL DAILY
Qty: 90 TABLET | Refills: 1 | OUTPATIENT
Start: 2024-07-19

## 2024-07-19 NOTE — TELEPHONE ENCOUNTER
Patient called back apologetic. Schedule with Dr Aly for 8/29. Please advise on refill of losartan. Has enough  till Monday.

## 2024-07-19 NOTE — TELEPHONE ENCOUNTER
Called Pt to make an appt. For medication refill. Pt wanted to see  and when I explained her schedule is full until Dec. And we needed to see him before that to continue meds. Pt upset and claims he was not told about 's senior living date.Asked if we sent messages via [employer] since that is his preferred communication,2 messages sent. Pt then stated he would talk to his wife and CB since he had no choice in who he was going to need to see.

## 2024-07-22 RX ORDER — LOSARTAN POTASSIUM 100 MG/1
100 TABLET ORAL DAILY
Qty: 90 TABLET | Refills: 1 | Status: SHIPPED | OUTPATIENT
Start: 2024-07-22

## 2024-08-06 ENCOUNTER — VBI (OUTPATIENT)
Dept: ADMINISTRATIVE | Facility: OTHER | Age: 54
End: 2024-08-06

## 2024-08-06 NOTE — TELEPHONE ENCOUNTER
08/06/24 11:28 AM     Chart reviewed for CRC: Colonoscopy ; nothing is submitted to the patient's insurance at this time.     Joaquin Marquez MA   [facility]

## 2024-08-29 ENCOUNTER — OFFICE VISIT (OUTPATIENT)
Dept: FAMILY MEDICINE CLINIC | Facility: MEDICAL CENTER | Age: 54
End: 2024-08-29
Payer: COMMERCIAL

## 2024-08-29 VITALS
OXYGEN SATURATION: 98 % | WEIGHT: 166.2 LBS | SYSTOLIC BLOOD PRESSURE: 120 MMHG | TEMPERATURE: 97 F | BODY MASS INDEX: 26.09 KG/M2 | DIASTOLIC BLOOD PRESSURE: 70 MMHG | HEART RATE: 72 BPM | HEIGHT: 67 IN | RESPIRATION RATE: 18 BRPM

## 2024-08-29 DIAGNOSIS — K21.9 GASTROESOPHAGEAL REFLUX DISEASE WITHOUT ESOPHAGITIS: ICD-10-CM

## 2024-08-29 DIAGNOSIS — K76.0 FATTY LIVER DISEASE, NONALCOHOLIC: ICD-10-CM

## 2024-08-29 DIAGNOSIS — I10 PRIMARY HYPERTENSION: Primary | ICD-10-CM

## 2024-08-29 DIAGNOSIS — L98.9 SKIN LESIONS: ICD-10-CM

## 2024-08-29 DIAGNOSIS — N18.2 STAGE 2 CHRONIC KIDNEY DISEASE: ICD-10-CM

## 2024-08-29 DIAGNOSIS — E78.2 MIXED HYPERLIPIDEMIA: ICD-10-CM

## 2024-08-29 DIAGNOSIS — G47.33 OSA (OBSTRUCTIVE SLEEP APNEA): ICD-10-CM

## 2024-08-29 PROBLEM — N18.31 STAGE 3A CHRONIC KIDNEY DISEASE (HCC): Status: RESOLVED | Noted: 2021-09-08 | Resolved: 2024-08-29

## 2024-08-29 PROCEDURE — 99214 OFFICE O/P EST MOD 30 MIN: CPT | Performed by: INTERNAL MEDICINE

## 2024-08-29 NOTE — PROGRESS NOTES
INTERNAL MEDICINE OFFICE VISIT  [facility] [address], [place] [address]  Tel: [phone]      NAME: Jeovany Paul  AGE: 54 y.o.  SEX: male  : 1970   MRN: [id number]    DATE: 2024  TIME: 10:58 AM      Assessment and Plan:  1. Primary hypertension  Was advised to continue the losartan and the Maxzide.    - Comprehensive metabolic panel; Future  - Lipid panel; Future  - TSH, 3rd generation; Future    2. Mixed hyperlipidemia  Continue Crestor    3. Stage 2 chronic kidney disease  Was told to keep himself well-hydrated and follow-up with nephrology    4. Fatty liver disease, nonalcoholic  Will follow-up.  Was encouraged to try to lose weight    5. Gastroesophageal reflux disease without esophagitis  Continue omeprazole    6. CORY (obstructive sleep apnea)  Continue using the CPAP regularly    7. Skin lesions    - Ambulatory Referral to Dermatology; Future      - Counseling Documentation: patient was counseled regarding: diagnostic results, instructions for management, risk factor reductions, prognosis, patient and family education, risks and benefits of treatment options, and importance of compliance with treatment  - Medication Side Effects: Adverse side effects of medications were reviewed with the patient/guardian today.      Return for follow up visit in 6 months or earlier, if needed.      Chief Complaint:  Chief Complaint   Patient presents with    Establish Care         History of Present Illness:   This is a patient of Dr. Aaron who needs to switch.  His blood pressure and cholesterol have been under control with medication.  His kidneys were worse before but now he is in stage II chronic kidney disease and his GFR is above 60.  He follows up with nephrology.  He was also diagnosed with fatty liver and is a little concerned about it  Continues to take omeprazole for the GERD and uses CPAP for the sleep apnea.  Has a few lesions on his scalp  and was told to see the dermatologist      Active Problem List:  Patient Active Problem List   Diagnosis    Hypertension    GERD (gastroesophageal reflux disease)    Mixed hyperlipidemia    Corneal staphyloma    Fatty liver disease, nonalcoholic    CORY (obstructive sleep apnea)    Stage 2 chronic kidney disease         Past Medical History:  Past Medical History:   Diagnosis Date    Chronic kidney disease     Episode of Reduced GFR     GERD (gastroesophageal reflux disease)     Hypertension     CORY (obstructive sleep apnea)     Sleep apnea          Past Surgical History:  Past Surgical History:   Procedure Laterality Date    CERVICAL DISC SURGERY      SPINE SURGERY      TONSILLECTOMY           Family History:  Family History   Problem Relation Age of Onset    Breast cancer Mother     COPD Father     Alcohol abuse Father     Coronary artery disease Father     Hypertension Father     Thyroid cancer Sister     Multiple sclerosis Sister     Alcohol abuse Brother     No Known Problems Son     Hypertension Family         Benign essential hypertension     Other Family     Heart disease Family     Lung disease Family     Cancer Family     Cancer Paternal Grandfather         Lung    Hypertension Paternal Grandfather     Lung cancer Paternal Grandfather          Social History:  Social History     Socioeconomic History    Marital status: /Civil Union     Spouse name: None    Number of children: None    Years of education: None    Highest education level: None   Occupational History    Occupation: employed   Tobacco Use    Smoking status: Former     Current packs/day: 0.00     Average packs/day: 1 pack/day for 25.2 years (25.2 ttl pk-yrs)     Types: Cigarettes     Start date:      Quit date: 3/15/2019     Years since quittin.4     Passive exposure: Past    Smokeless tobacco: Never   Vaping Use    Vaping status: Former   Substance and Sexual Activity    Alcohol use: Yes     Alcohol/week: 20.0 standard  drinks of alcohol     Types: 20 Cans of beer per week     Comment: 3-4 beer daily    Drug use: No    Sexual activity: Not Currently     Partners: Female   Other Topics Concern    None   Social History Narrative    Daily coffee consumption (6 cups/day)    Former smoker - As per [employer]    Smoking electronic cigarettes - As per [employer]      Social Determinants of Health     Financial Resource Strain: Not on file   Food Insecurity: Not on file   Transportation Needs: Not on file   Physical Activity: Not on file   Stress: Not on file   Social Connections: Not on file   Intimate Partner Violence: Not on file   Housing Stability: Not on file         Allergies:  Allergies   Allergen Reactions    Sulfa Antibiotics          Medications:    Current Outpatient Medications:     losartan (COZAAR) 100 MG tablet, Take 1 tablet (100 mg total) by mouth daily, Disp: 90 tablet, Rfl: 1    omeprazole (PriLOSEC) 20 mg delayed release capsule, Take 1 capsule by mouth daily, Disp: , Rfl:     rosuvastatin (CRESTOR) 5 mg tablet, take 1 tablet by mouth once daily, Disp: 90 tablet, Rfl: 1    triamterene-hydrochlorothiazide (MAXZIDE-25) 37.5-25 mg per tablet, take 1 tablet by mouth once daily, Disp: 90 tablet, Rfl: 1      The following portions of the patient's history were reviewed and updated as appropriate: past medical history, past surgical history, family history, social history, allergies, current medications and active problem list.      Review of Systems:  Constitutional: Denies fever, chills, weight gain, weight loss, fatigue  Eyes: Denies eye redness, eye discharge, double vision, change in visual acuity  ENT: Denies hearing loss, tinnitus, sneezing, nasal congestion, nasal discharge, sore throat   Respiratory: Denies cough, expectoration, hemoptysis, shortness of breath, wheezing  Cardiovascular: Denies chest pain, palpitations, lower extremity swelling, orthopnea, PND  Gastrointestinal: Denies abdominal pain, heartburn,  nausea, vomiting, hematemesis, diarrhea, bloody stools  Genito-Urinary: Denies dysuria, frequency, difficulty in micturition, nocturia, incontinence  Musculoskeletal: Denies back pain, joint pain, muscle pain  Neurologic: Denies confusion, lightheadedness, syncope, headache, focal weakness, sensory changes, seizures  Endocrine: Denies polyuria, polydipsia, temperature intolerance  Allergy and Immunology: Denies hives, insect bite sensitivity  Hematological and Lymphatic: Denies bleeding problems, swollen glands   Psychological: Denies depression, suicidal ideation, anxiety, panic, mood swings  Dermatological: Denies pruritus, rash, skin lesion changes      Vitals:  Vitals:    08/29/24 1053   BP: 120/70   Pulse:    Resp:    Temp:    SpO2:        Body mass index is 26.42 kg/m².    Weight (last 2 days)       Date/Time Weight    08/29/24 1031 75.4 (166.2)              Physical Examination:  General: Patient is not in acute distress. Awake, alert, responding to commands. No weight gain or loss  Head: Normocephalic. Atraumatic  Eyes: Conjunctiva and lids with no swelling, erythema or discharge. Both pupils normal sized, round and reactive to light. Sclera nonicteric  ENT: External examination of nose and ear normal. Otoscopic examination shows translucent tympanic membranes with patent canals without erythema. Oropharynx moist with no erythema, edema, exudate or lesions  Neck: Supple. JVP not raised. Trachea midline. No masses. No thyromegaly  Lungs: No signs of increased work of breathing or respiratory distress. Bilateral bronchovascular breath sounds with no crackles or rhonchi  Chest wall: No tenderness  Cardiovascular: Normal PMI. No thrills. Regular rate and rhythm. S1 and S2 normal. No murmur, rub or gallop  Gastrointestinal: Abdomen soft, nontender. No guarding or rigidity. Liver and spleen not palpable. Bowel sounds present  Neurologic: Cranial nerves II-XII intact. Cortical functions normal. Motor system -  Reflexes 2+ and symmetrical. Sensations normal  Musculoskeletal: Gait normal. No joint tenderness  Integumentary: Skin normal with no rash or lesions  Lymphatic: No palpable lymph nodes in neck, axilla or groin  Extremities: No clubbing, cyanosis, edema or varicosities  Psychological: Judgement and insight normal. Mood and affect normal      Laboratory Results:  CBC with diff:   Lab Results   Component Value Date    WBC 5.91 07/06/2024    WBC 10.06 01/08/2016    RBC 4.21 07/06/2024    RBC 4.66 01/08/2016    HGB 14.2 07/06/2024    HGB 15.7 01/08/2016    HCT 41.0 07/06/2024    HCT 45.1 01/08/2016    MCV 97 07/06/2024    MCV 97 01/08/2016    MCH 33.7 07/06/2024    MCH 33.7 01/08/2016    RDW 12.0 07/06/2024    RDW 12.6 01/08/2016     07/06/2024     01/08/2016       CMP:  Lab Results   Component Value Date    CREATININE 1.29 07/06/2024    CREATININE 1.23 01/08/2016    BUN 17 07/06/2024    BUN 14 01/08/2016     (L) 01/08/2016    K 3.8 07/06/2024    K 4.4 01/08/2016    CL 96 07/06/2024    CL 99 (L) 01/08/2016    CO2 25 07/06/2024    CO2 26 01/08/2016    GLUCOSE 90 01/08/2016    PROT 8.2 01/08/2016    ALKPHOS 53 04/30/2024    ALKPHOS 94 01/08/2016    ALT 17 04/30/2024    ALT 56 01/08/2016    AST 22 04/30/2024    AST 35 01/08/2016       Lab Results   Component Value Date    HGBA1C 5.2 04/30/2024    HGBA1C 5.2 07/25/2015    MG 1.9 10/31/2016    PHOS 2.3 (L) 07/06/2024       Lab Results   Component Value Date    TROPONINI <0.02 11/17/2020    TROPONINI <0.02 10/31/2016    TROPONINI <0.02 10/31/2016       Lipid Profile:   Lab Results   Component Value Date    CHOL 263 01/08/2016    CHOL 247 07/25/2015     Lab Results   Component Value Date    HDL 68 04/30/2024    HDL 57 04/14/2023     Lab Results   Component Value Date    LDLCALC 96 04/30/2024    LDLCALC 93 04/14/2023     Lab Results   Component Value Date    TRIG 123 04/30/2024    TRIG 134 04/14/2023       Imaging Results:  CT abdomen pelvis wo  contrast  Narrative: CT ABDOMEN AND PELVIS WITHOUT IV CONTRAST    INDICATION:   upper abd pain.    COMPARISON:  March 2, 2013    TECHNIQUE:  CT examination of the abdomen and pelvis was performed without intravenous contrast.  Axial, sagittal, and coronal 2D reformatted images were created from the source data and submitted for interpretation.     Radiation dose length product (DLP) for this visit:  451 mGy-cm .  This examination, like all CT scans performed in the [facility], was performed utilizing techniques to minimize radiation dose exposure, including the use of iterative   reconstruction and automated exposure control.     Enteric contrast was not administered.     FINDINGS:    ABDOMEN    LOWER CHEST:  No clinically significant abnormality identified in the visualized lower chest.    LIVER/BILIARY TREE:  Mild to moderate steatosis.    GALLBLADDER:  No calcified gallstones. No pericholecystic inflammatory change.    SPLEEN:  Unremarkable.    PANCREAS:  Unremarkable.    ADRENAL GLANDS:  Unremarkable.    KIDNEYS/URETERS:  Unremarkable. No hydronephrosis.    STOMACH AND BOWEL:  Unremarkable.    APPENDIX:  No findings to suggest appendicitis.    ABDOMINOPELVIC CAVITY:  No ascites.  No pneumoperitoneum.  No lymphadenopathy.    VESSELS:  Unremarkable for patient's age.    PELVIS    REPRODUCTIVE ORGANS:  Unremarkable for patient's age.    URINARY BLADDER:  Unremarkable.    ABDOMINAL WALL/INGUINAL REGIONS:  Unremarkable.    OSSEOUS STRUCTURES:  No acute fracture or destructive osseous lesion.  Impression: No acute abnormality identified.    Hepatic steatosis.    Workstation performed: [id number]  XR chest pa & lateral  Narrative: CHEST     INDICATION:   cp.    COMPARISON:  Two-view chest 7/23/2020    EXAM PERFORMED/VIEWS:  XR CHEST PA & LATERAL    FINDINGS:  Lower cervical spine fixation hardware.    Cardiomediastinal silhouette appears unremarkable.    The lungs are clear.  No pneumothorax or  pleural effusion.    Osseous structures appear within normal limits for patient age.  Impression: No acute cardiopulmonary disease.    Workstation performed: [id number]       Health Maintenance:  Health Maintenance   Topic Date Due    Hepatitis C Screening  Never done    HIV Screening  Never done    Colorectal Cancer Screening  Never done    COVID-19 Vaccine (7 - 2023-24 season) 09/01/2023    Influenza Vaccine (1) 09/01/2024    Annual Physical  10/03/2024    Lung Cancer Screening  10/03/2024 (Originally 5/3/2021)    Depression Screening  08/29/2025    RSV Vaccine Age 60+ Years (1 - 1-dose 60+ series) 05/03/2031    DTaP,Tdap,and Td Vaccines (2 - Td or Tdap) 07/18/2032    Zoster Vaccine  Completed    RSV Vaccine age 0-20 Months  Aged Out    Pneumococcal Vaccine: Pediatrics (0 to 5 Years) and At-Risk Patients (6 to 64 Years)  Aged Out    HIB Vaccine  Aged Out    IPV Vaccine  Aged Out    Hepatitis A Vaccine  Aged Out    Meningococcal ACWY Vaccine  Aged Out    HPV Vaccine  Aged Out     Immunization History   Administered Date(s) Administered    COVID-19 MODERNA VACC 0.25 ML IM BOOSTER 11/10/2021    COVID-19 MODERNA VACC 0.5 ML IM 02/04/2021, 03/03/2021, 11/10/2021, 09/07/2022    COVID-19 Moderna Vac BIVALENT 12 Yr+ IM 0.5 ML 09/07/2022    INFLUENZA 10/01/2014, 10/14/2015, 10/05/2016, 09/19/2017, 09/25/2018, 09/04/2020, 09/01/2021, 09/03/2022, 09/08/2023    Influenza, injectable, quadrivalent, preservative free 0.5 mL 09/20/2019, 09/04/2020    Influenza, seasonal, injectable 10/28/2013    Pneumococcal Conjugate Vaccine 20-valent (Pcv20), Polysace 12/20/2022    Tdap 07/18/2022    Zoster Vaccine Recombinant 12/20/2022, 02/28/2023         Justine Aly MD  8/29/2024,10:58 AM

## 2024-09-10 DIAGNOSIS — E78.2 MIXED HYPERLIPIDEMIA: ICD-10-CM

## 2024-09-10 RX ORDER — ROSUVASTATIN CALCIUM 5 MG/1
5 TABLET, COATED ORAL DAILY
Qty: 90 TABLET | Refills: 1 | Status: SHIPPED | OUTPATIENT
Start: 2024-09-10

## 2024-09-10 NOTE — TELEPHONE ENCOUNTER
Reason for call:   [x] Refill   [] Prior Auth  [] Other:     Office:   [x] PCP/Provider - FP SONNY Aaron MD   [] Specialty/Provider -     Medication:  rosuvastatin (CRESTOR) 5 mg tablet    Dose/Frequency:  take 1 tablet by mouth once daily     Quantity: 90 tablets    [id number] [address] [phone]

## 2024-09-29 DIAGNOSIS — I10 ESSENTIAL HYPERTENSION: ICD-10-CM

## 2024-09-30 RX ORDER — TRIAMTERENE/HYDROCHLOROTHIAZID 37.5-25 MG
TABLET ORAL
Qty: 90 TABLET | Refills: 1 | Status: SHIPPED | OUTPATIENT
Start: 2024-09-30

## 2024-10-15 ENCOUNTER — TELEPHONE (OUTPATIENT)
Dept: NEPHROLOGY | Facility: CLINIC | Age: 54
End: 2024-10-15

## 2024-10-22 ENCOUNTER — APPOINTMENT (OUTPATIENT)
Dept: LAB | Facility: CLINIC | Age: 54
End: 2024-10-22
Payer: COMMERCIAL

## 2024-10-22 DIAGNOSIS — I10 PRIMARY HYPERTENSION: ICD-10-CM

## 2024-10-22 DIAGNOSIS — N18.31 STAGE 3A CHRONIC KIDNEY DISEASE (HCC): ICD-10-CM

## 2024-10-22 LAB
25(OH)D3 SERPL-MCNC: 66.3 NG/ML (ref 30–100)
BACTERIA UR QL AUTO: ABNORMAL /HPF
BASOPHILS # BLD AUTO: 0.12 THOUSANDS/ΜL (ref 0–0.1)
BASOPHILS NFR BLD AUTO: 2 % (ref 0–1)
BILIRUB UR QL STRIP: NEGATIVE
CLARITY UR: CLEAR
COLOR UR: YELLOW
CREAT UR-MCNC: 258.5 MG/DL
EOSINOPHIL # BLD AUTO: 0.09 THOUSAND/ΜL (ref 0–0.61)
EOSINOPHIL NFR BLD AUTO: 1 % (ref 0–6)
ERYTHROCYTE [DISTWIDTH] IN BLOOD BY AUTOMATED COUNT: 12 % (ref 11.6–15.1)
GLUCOSE UR STRIP-MCNC: NEGATIVE MG/DL
HCT VFR BLD AUTO: 40 % (ref 36.5–49.3)
HGB BLD-MCNC: 13.4 G/DL (ref 12–17)
HGB UR QL STRIP.AUTO: NEGATIVE
IMM GRANULOCYTES # BLD AUTO: 0.09 THOUSAND/UL (ref 0–0.2)
IMM GRANULOCYTES NFR BLD AUTO: 1 % (ref 0–2)
KETONES UR STRIP-MCNC: NEGATIVE MG/DL
LEUKOCYTE ESTERASE UR QL STRIP: NEGATIVE
LYMPHOCYTES # BLD AUTO: 1.28 THOUSANDS/ΜL (ref 0.6–4.47)
LYMPHOCYTES NFR BLD AUTO: 16 % (ref 14–44)
MCH RBC QN AUTO: 33.6 PG (ref 26.8–34.3)
MCHC RBC AUTO-ENTMCNC: 33.5 G/DL (ref 31.4–37.4)
MCV RBC AUTO: 100 FL (ref 82–98)
MICROALBUMIN UR-MCNC: 32 MG/L
MICROALBUMIN/CREAT 24H UR: 12 MG/G CREATININE (ref 0–30)
MONOCYTES # BLD AUTO: 0.91 THOUSAND/ΜL (ref 0.17–1.22)
MONOCYTES NFR BLD AUTO: 12 % (ref 4–12)
MUCOUS THREADS UR QL AUTO: ABNORMAL
NEUTROPHILS # BLD AUTO: 5.42 THOUSANDS/ΜL (ref 1.85–7.62)
NEUTS SEG NFR BLD AUTO: 68 % (ref 43–75)
NITRITE UR QL STRIP: NEGATIVE
NON-SQ EPI CELLS URNS QL MICRO: ABNORMAL /HPF
NRBC BLD AUTO-RTO: 0 /100 WBCS
PH UR STRIP.AUTO: 6 [PH]
PLATELET # BLD AUTO: 222 THOUSANDS/UL (ref 149–390)
PMV BLD AUTO: 10.2 FL (ref 8.9–12.7)
PROT UR STRIP-MCNC: ABNORMAL MG/DL
PTH-INTACT SERPL-MCNC: 89.7 PG/ML (ref 12–88)
RBC # BLD AUTO: 3.99 MILLION/UL (ref 3.88–5.62)
RBC #/AREA URNS AUTO: ABNORMAL /HPF
SP GR UR STRIP.AUTO: 1.02 (ref 1–1.03)
TSH SERPL DL<=0.05 MIU/L-ACNC: 0.75 UIU/ML (ref 0.45–4.5)
UROBILINOGEN UR STRIP-ACNC: <2 MG/DL
WBC # BLD AUTO: 7.91 THOUSAND/UL (ref 4.31–10.16)
WBC #/AREA URNS AUTO: ABNORMAL /HPF

## 2024-10-22 PROCEDURE — 84550 ASSAY OF BLOOD/URIC ACID: CPT

## 2024-10-22 PROCEDURE — 82043 UR ALBUMIN QUANTITATIVE: CPT

## 2024-10-22 PROCEDURE — 82570 ASSAY OF URINE CREATININE: CPT

## 2024-10-22 PROCEDURE — 36415 COLL VENOUS BLD VENIPUNCTURE: CPT

## 2024-10-22 PROCEDURE — 80061 LIPID PANEL: CPT

## 2024-10-22 PROCEDURE — 85025 COMPLETE CBC W/AUTO DIFF WBC: CPT

## 2024-10-22 PROCEDURE — 84100 ASSAY OF PHOSPHORUS: CPT

## 2024-10-22 PROCEDURE — 80053 COMPREHEN METABOLIC PANEL: CPT

## 2024-10-22 PROCEDURE — 84443 ASSAY THYROID STIM HORMONE: CPT

## 2024-10-22 PROCEDURE — 83970 ASSAY OF PARATHORMONE: CPT

## 2024-10-22 PROCEDURE — 81001 URINALYSIS AUTO W/SCOPE: CPT

## 2024-10-22 PROCEDURE — 82306 VITAMIN D 25 HYDROXY: CPT

## 2024-10-23 LAB
ALBUMIN SERPL BCG-MCNC: 4.6 G/DL (ref 3.5–5)
ALP SERPL-CCNC: 58 U/L (ref 34–104)
ALT SERPL W P-5'-P-CCNC: 27 U/L (ref 7–52)
ANION GAP SERPL CALCULATED.3IONS-SCNC: 11 MMOL/L (ref 4–13)
AST SERPL W P-5'-P-CCNC: 30 U/L (ref 13–39)
BILIRUB SERPL-MCNC: 0.44 MG/DL (ref 0.2–1)
BUN SERPL-MCNC: 22 MG/DL (ref 5–25)
CALCIUM SERPL-MCNC: 9.1 MG/DL (ref 8.4–10.2)
CHLORIDE SERPL-SCNC: 100 MMOL/L (ref 96–108)
CHOLEST SERPL-MCNC: 185 MG/DL
CO2 SERPL-SCNC: 26 MMOL/L (ref 21–32)
CREAT SERPL-MCNC: 1.34 MG/DL (ref 0.6–1.3)
GFR SERPL CREATININE-BSD FRML MDRD: 60 ML/MIN/1.73SQ M
GLUCOSE P FAST SERPL-MCNC: 108 MG/DL (ref 65–99)
HDLC SERPL-MCNC: 59 MG/DL
LDLC SERPL CALC-MCNC: 65 MG/DL (ref 0–100)
NONHDLC SERPL-MCNC: 126 MG/DL
PHOSPHATE SERPL-MCNC: 3.3 MG/DL (ref 2.7–4.5)
POTASSIUM SERPL-SCNC: 4.3 MMOL/L (ref 3.5–5.3)
PROT SERPL-MCNC: 7.1 G/DL (ref 6.4–8.4)
SODIUM SERPL-SCNC: 137 MMOL/L (ref 135–147)
TRIGL SERPL-MCNC: 307 MG/DL
URATE SERPL-MCNC: 7.7 MG/DL (ref 3.5–8.5)

## 2024-10-27 NOTE — PROGRESS NOTES
NEPHROLOGY OFFICE NOTE    Patient: Jeovany Paul               Sex: male           YOB: 1970        Age:  54 y.o.       10/28/2024    ASSESSMENT/PLAN     Assessment & Plan  Stage 2 chronic kidney disease  Lab Results   Component Value Date    EGFR 60 10/22/2024    EGFR 62 07/06/2024    EGFR 45 04/30/2024    CREATININE 1.34 (H) 10/22/2024    CREATININE 1.29 07/06/2024    CREATININE 1.69 (H) 04/30/2024       Orders:    CBC and differential; Future    Basic metabolic panel; Future    Urinalysis with microscopic; Future    Albumin / creatinine urine ratio; Future    After review of the medical record, it appears baseline creatinine level fluctuates around 1.3 - 1.6 dating back through September of 2021. Suspect etiology of chronic kidney disease is multifactorial in the setting of hypertensive nephrosclerosis.     Prior CT imaging of the abdomen obtained on 11/17/2020 noted kidneys were unremarkable with no evidence of hydronephrosis.     Current creatinine level is 1.34 with an eGFR of 60.  Was within normal limits.  Advised importance of staying hydrated and cautioned against use of nephrotoxic agent such as NSAIDs.  Will repeat CBC, BMP, UA, and urine microalbumin to creatinine ratio prior to follow-up.  Primary hypertension    Orders:    Basic metabolic panel; Future    Urinalysis with microscopic; Future    Albumin / creatinine urine ratio; Future    Currently maintained on losartan 100 mg daily and triamterene - hydrochlorothiazide 27.5 - 25 mg daily.  Blood pressure within acceptable range on the current regimen.  Hyperparathyroidism (HCC)    Orders:    PTH, intact; Future    Vitamin D 25 hydroxy; Future    PTH level slightly elevated at 89.7.  Vitamin D level within normal limits at 66.3. No evidence of hypercalcemia with current calcium level of 9.1 and phosphorus within normal limits at 3.3.  I suspect this may be secondary to underlying chronic kidney disease, will monitor at this time and  repeat PTH level prior to follow-up.    BACKGROUND     I had the pleasure of seeing Jeovany Paul in the nephrology office today for follow-up. He has a past medical history significant for hypertension, GERD, CORY on CPAP, and chronic kidney disease.      He is following with Pulmonology for management of CORY, currently maintained on CPAP.      He has underlying GERD, currently maintained on omeprazole.     Does report a history of EtOH use but has cut back significantly.  He does endorse use of over-the-counter supplements such as turmeric and ashwagandha.  Reports that since his last office visit he has stopped using ashwagandha.  He also denies routine use of over-the-counter NSAIDs.     He has a history of hypertension, maintained on losartan and triamterene - hydrochlorothiazide.  Actively monitoring blood pressure at home.    Most recent labs were obtained on 10/22/2024, which we have reviewed together.     SUBJECTIVE     He currently has no major complaints at this time and is feeling well.    REVIEW OF SYSTEMS     Review of Systems   Constitutional:  Negative for activity change and chills.   HENT:  Negative for trouble swallowing.    Respiratory:  Negative for shortness of breath.    Cardiovascular:  Negative for leg swelling.   Gastrointestinal:  Positive for nausea (intermittent). Negative for constipation, diarrhea and vomiting.   Genitourinary:  Negative for difficulty urinating, dysuria, frequency and hematuria.   Musculoskeletal:  Negative for back pain.   Skin:  Negative for pallor.   Neurological:  Negative for dizziness, syncope, weakness and light-headedness.        Episodes of neuropathy   Psychiatric/Behavioral:  Negative for sleep disturbance.        OBJECTIVE     Current Weight: Weight - Scale: 74.4 kg (164 lb)  Vitals:    10/28/24 0920   BP: 120/78   Pulse: 89   Resp: 16   Temp: (!) 97.3 °F (36.3 °C)   SpO2: 99%     Body mass index is 25.69 kg/m².    CURRENT MEDICATIONS       Current Outpatient  Medications:     losartan (COZAAR) 100 MG tablet, Take 1 tablet (100 mg total) by mouth daily, Disp: 90 tablet, Rfl: 1    omeprazole (PriLOSEC) 20 mg delayed release capsule, Take 1 capsule by mouth daily, Disp: , Rfl:     rosuvastatin (CRESTOR) 5 mg tablet, Take 1 tablet (5 mg total) by mouth daily, Disp: 90 tablet, Rfl: 1    triamterene-hydrochlorothiazide (MAXZIDE-25) 37.5-25 mg per tablet, take 1 tablet by mouth once daily, Disp: 90 tablet, Rfl: 1    PHYSICAL EXAMINATION     Physical Exam  Vitals reviewed.   Constitutional:       General: He is not in acute distress.  HENT:      Head: Normocephalic.      Mouth/Throat:      Lips: Pink.      Mouth: Mucous membranes are moist.   Eyes:      General: Lids are normal. No scleral icterus.  Cardiovascular:      Rate and Rhythm: Normal rate and regular rhythm.      Heart sounds: S1 normal and S2 normal. No murmur heard.  Pulmonary:      Effort: Pulmonary effort is normal. No accessory muscle usage or respiratory distress.      Breath sounds: Normal breath sounds.   Abdominal:      General: There is no distension.      Tenderness: There is no abdominal tenderness.   Musculoskeletal:      Cervical back: Normal range of motion and neck supple. No tenderness.      Right lower leg: No edema.      Left lower leg: No edema.   Skin:     General: Skin is warm.      Coloration: Skin is not cyanotic or jaundiced.   Neurological:      General: No focal deficit present.      Mental Status: He is alert and oriented to person, place, and time.   Psychiatric:         Attention and Perception: Attention normal.         Speech: Speech normal.         Behavior: Behavior is cooperative.           LAB RESULTS     Results from last 7 days   Lab Units 10/22/24  0742   WBC Thousand/uL 7.91   HEMOGLOBIN g/dL 13.4   HEMATOCRIT % 40.0   PLATELETS Thousands/uL 222   SODIUM mmol/L 137   POTASSIUM mmol/L 4.3   CHLORIDE mmol/L 100   CO2 mmol/L 26   BUN mg/dL 22   CREATININE mg/dL 1.34*   EGFR  "ml/min/1.73sq m 60   CALCIUM mg/dL 9.1   PHOSPHORUS mg/dL 3.3         RADIOLOGY RESULTS      No results found for this or any previous visit.    Results for orders placed during the hospital encounter of 11/17/20    XR chest pa & lateral    Narrative  CHEST    INDICATION:   cp.    COMPARISON:  Two-view chest 7/23/2020    EXAM PERFORMED/VIEWS:  XR CHEST PA & LATERAL      FINDINGS:  Lower cervical spine fixation hardware.    Cardiomediastinal silhouette appears unremarkable.    The lungs are clear.  No pneumothorax or pleural effusion.    Osseous structures appear within normal limits for patient age.    Impression  No acute cardiopulmonary disease.            Workstation performed: [id number]    CT abdomen and pelvis 11/17/2020:  FINDINGS:     ABDOMEN     LOWER CHEST:  No clinically significant abnormality identified in the visualized lower chest.     LIVER/BILIARY TREE:  Mild to moderate steatosis.     GALLBLADDER:  No calcified gallstones. No pericholecystic inflammatory change.     SPLEEN:  Unremarkable.     PANCREAS:  Unremarkable.     ADRENAL GLANDS:  Unremarkable.     KIDNEYS/URETERS:  Unremarkable. No hydronephrosis.     STOMACH AND BOWEL:  Unremarkable.     APPENDIX:  No findings to suggest appendicitis.     ABDOMINOPELVIC CAVITY:  No ascites.  No pneumoperitoneum.  No lymphadenopathy.     VESSELS:  Unremarkable for patient's age.     PELVIS     REPRODUCTIVE ORGANS:  Unremarkable for patient's age.     URINARY BLADDER:  Unremarkable.     ABDOMINAL WALL/INGUINAL REGIONS:  Unremarkable.     OSSEOUS STRUCTURES:  No acute fracture or destructive osseous lesion.     IMPRESSION:     No acute abnormality identified.     Hepatic steatosis.    Recommend Nephrology follow-up in 6 months.    KATHY King  Nephrology  10/28/2024      Portions of the record may have been created with voice recognition software. Occasional wrong word or \"sound a like\" substitutions may have occurred due to the inherent " limitations of voice recognition software. Read the chart carefully and recognize, using context, where substitutions have occurred.

## 2024-10-28 ENCOUNTER — OFFICE VISIT (OUTPATIENT)
Dept: NEPHROLOGY | Facility: CLINIC | Age: 54
End: 2024-10-28
Payer: COMMERCIAL

## 2024-10-28 VITALS
WEIGHT: 164 LBS | RESPIRATION RATE: 16 BRPM | DIASTOLIC BLOOD PRESSURE: 78 MMHG | OXYGEN SATURATION: 99 % | HEART RATE: 89 BPM | HEIGHT: 67 IN | BODY MASS INDEX: 25.74 KG/M2 | SYSTOLIC BLOOD PRESSURE: 120 MMHG | TEMPERATURE: 97.3 F

## 2024-10-28 DIAGNOSIS — I10 PRIMARY HYPERTENSION: ICD-10-CM

## 2024-10-28 DIAGNOSIS — E21.3 HYPERPARATHYROIDISM (HCC): ICD-10-CM

## 2024-10-28 DIAGNOSIS — N18.2 STAGE 2 CHRONIC KIDNEY DISEASE: Primary | ICD-10-CM

## 2024-10-28 PROCEDURE — 99214 OFFICE O/P EST MOD 30 MIN: CPT

## 2024-10-28 NOTE — ASSESSMENT & PLAN NOTE
Orders:    Basic metabolic panel; Future    Urinalysis with microscopic; Future    Albumin / creatinine urine ratio; Future    Currently maintained on losartan 100 mg daily and triamterene - hydrochlorothiazide 27.5 - 25 mg daily.  Blood pressure within acceptable range on the current regimen.

## 2024-10-28 NOTE — PATIENT INSTRUCTIONS
"Patient Education     Chronic kidney disease   The Basics   Written by the doctors and editors at [facility]   What is chronic kidney disease? -- Chronic kidney disease, or \"CKD,\" is when the kidneys stop working as well as they should. When they are working normally, the kidneys filter blood and remove waste and excess salt and water (figure 1).  In people with CKD, the kidneys slowly lose the ability to filter blood. In time, the kidneys can stop working completely. That is why it is so important to keep CKD from getting worse.  What are the symptoms of CKD? -- At first, CKD causes no symptoms. As the disease gets worse, it can:   Make your feet, ankles, or legs swell (called \"edema\")   Give you high blood pressure   Make you very tired   Damage your bones  Will I need tests? -- Yes. Your doctor will want to see you regularly. You will probably have appointments at least once a year, and you will get regular tests to check your kidneys. These include blood and urine tests.  If your CKD gets worse over time, you will probably need to see a \"nephrologist.\" This is a doctor who takes care of people with kidney disease.  Is there anything I can do to keep my kidneys from getting worse? -- Yes. If you have CKD, you can protect your kidneys if you:   Take all of your prescribed medicines every day, and follow all of your doctor's instructions for how to take them.   Keep your blood sugar in a healthy range, if you have diabetes.   Change your diet, if your doctor or nurse recommends to. They might suggest working with a dietitian (nutrition expert).   Quit smoking, if you smoke.   Lose weight, if you have excess body weight.   Avoid medicines that can harm the kidneys - One example is nonsteroidal antiinflammatory drugs (\"NSAIDs\"). These medicines include ibuprofen (sample brand names: Advil, Motrin) and naproxen (sample brand name: Aleve). There are other medicines that people with CKD need to avoid, too. Check with your " "doctor, nurse, or kidney specialist before starting any new medicines or supplements, even those you can buy without a prescription.  How is CKD treated? -- People in the early stages of CKD can take medicines to keep the disease from getting worse. For example, many people with CKD should take medicines known as \"ACE inhibitors\" or \"angiotensin receptor blockers.\" If your doctor or nurse prescribes these medicines, it is very important that you take them every day as directed. If they cause side effects or cost too much, tell your doctor or nurse. They might have solutions to offer.  What happens if my kidneys stop working completely? -- If your kidneys can no longer filter blood properly, you can choose between 3 different treatments to take over their job. Your choices are:   Kidney transplant - After transplant surgery, the new kidney can do the job of your own kidneys. If you have a kidney transplant, you will need to take medicines for the rest of your life to keep your body from reacting badly to the new kidney. (You only need 1 kidney to live.)   Hemodialysis - This is a procedure in which a dialysis machine takes over the job of the kidneys. The machine pumps blood out of the body, filters it, and returns it to the body. If you choose hemodialysis, you will need to be hooked up to the machine at least 3 times a week for several hours for the rest of your life. Before you start, you will also need to have surgery to prepare a blood vessel for attachment to the machine.   Peritoneal dialysis - This involves piping a special fluid into the belly every day. If you choose peritoneal dialysis, you will need surgery to have a tube implanted in your belly. Then, you will have to learn how to pipe the fluid in and out through that tube.  How do I choose between the different treatment options? -- You and your doctor will need to work together to find a treatment that's right for you. Kidney transplant surgery is " usually the best option for most people. But often, there are no kidneys available for transplant.  Ask your doctor to explain all of your options and how they might work for you. Then, talk openly with them about how you feel about all of the options. You might even decide that you do not want any treatment. That is your choice.  All topics are updated as new evidence becomes available and our peer review process is complete.  This topic retrieved from [employer] on: May 18, 2024.  Topic 44878 Version 34.0  Release: 32.4.3 - C32.137  © 2024 UpToDate, Inc. and/or its affiliates. All rights reserved.  figure 1: Anatomy of the urinary tract     Urine is made by the kidneys. It passes from the kidneys into the bladder through 2 tubes called the ureters. Then, it leaves the bladder through another tube called the urethra.  Graphic 31782 Version 8.0  Consumer Information Use and Disclaimer   Disclaimer: This generalized information is a limited summary of diagnosis, treatment, and/or medication information. It is not meant to be comprehensive and should be used as a tool to help the user understand and/or assess potential diagnostic and treatment options. It does NOT include all information about conditions, treatments, medications, side effects, or risks that may apply to a specific patient. It is not intended to be medical advice or a substitute for the medical advice, diagnosis, or treatment of a health care provider based on the health care provider's examination and assessment of a patient's specific and unique circumstances. Patients must speak with a health care provider for complete information about their health, medical questions, and treatment options, including any risks or benefits regarding use of medications. This information does not endorse any treatments or medications as safe, effective, or approved for treating a specific patient. UpToDate, Inc. and its affiliates disclaim any warranty or liability  relating to this information or the use thereof.The use of this information is governed by the Terms of Use, available at https://www.wolterskluwer.com/en/know/clinical-effectiveness-terms. 2024© UpToDate, Inc. and its affiliates and/or licensors. All rights reserved.  Copyright   © 2024 [employer] and/or its affiliates. All rights reserved.

## 2024-10-28 NOTE — ASSESSMENT & PLAN NOTE
Lab Results   Component Value Date    EGFR 60 10/22/2024    EGFR 62 07/06/2024    EGFR 45 04/30/2024    CREATININE 1.34 (H) 10/22/2024    CREATININE 1.29 07/06/2024    CREATININE 1.69 (H) 04/30/2024       Orders:    CBC and differential; Future    Basic metabolic panel; Future    Urinalysis with microscopic; Future    Albumin / creatinine urine ratio; Future    After review of the medical record, it appears baseline creatinine level fluctuates around 1.3 - 1.6 dating back through September of 2021. Suspect etiology of chronic kidney disease is multifactorial in the setting of hypertensive nephrosclerosis.     Prior CT imaging of the abdomen obtained on 11/17/2020 noted kidneys were unremarkable with no evidence of hydronephrosis.     Current creatinine level is 1.34 with an eGFR of 60.  Was within normal limits.  Advised importance of staying hydrated and cautioned against use of nephrotoxic agent such as NSAIDs.  Will repeat CBC, BMP, UA, and urine microalbumin to creatinine ratio prior to follow-up.

## 2024-12-16 ENCOUNTER — VBI (OUTPATIENT)
Dept: ADMINISTRATIVE | Facility: OTHER | Age: 54
End: 2024-12-16

## 2024-12-16 NOTE — TELEPHONE ENCOUNTER
12/16/24 5:11 PM     Chart reviewed for CRC: Colonoscopy ; nothing is submitted to the patient's insurance at this time.     Joaquin Marquez MA   [facility]

## 2025-01-06 ENCOUNTER — TELEPHONE (OUTPATIENT)
Age: 55
End: 2025-01-06

## 2025-01-06 NOTE — TELEPHONE ENCOUNTER
Spoke with pt to schedule recommended sleep fu with dr lawrence with [facility] for may    pt states he will call back before end of day today to schedule   please schedule pt

## 2025-01-13 DIAGNOSIS — I10 ESSENTIAL HYPERTENSION: ICD-10-CM

## 2025-01-14 RX ORDER — LOSARTAN POTASSIUM 100 MG/1
100 TABLET ORAL DAILY
Qty: 90 TABLET | Refills: 1 | Status: SHIPPED | OUTPATIENT
Start: 2025-01-14

## 2025-03-07 DIAGNOSIS — E78.2 MIXED HYPERLIPIDEMIA: ICD-10-CM

## 2025-03-10 RX ORDER — ROSUVASTATIN CALCIUM 5 MG/1
5 TABLET, COATED ORAL DAILY
Qty: 90 TABLET | Refills: 1 | Status: SHIPPED | OUTPATIENT
Start: 2025-03-10

## 2025-03-10 NOTE — TELEPHONE ENCOUNTER
Patient scheduled follow up appointment. His availability did not work until April. He is asking if PCP can please refill his medication until the day of his appointment with PCP.

## 2025-03-21 DIAGNOSIS — I10 ESSENTIAL HYPERTENSION: ICD-10-CM

## 2025-03-21 RX ORDER — TRIAMTERENE AND HYDROCHLOROTHIAZIDE 37.5; 25 MG/1; MG/1
1 TABLET ORAL DAILY
Qty: 90 TABLET | Refills: 1 | Status: SHIPPED | OUTPATIENT
Start: 2025-03-21

## 2025-04-08 ENCOUNTER — RA CDI HCC (OUTPATIENT)
Dept: OTHER | Facility: HOSPITAL | Age: 55
End: 2025-04-08

## 2025-04-15 ENCOUNTER — OFFICE VISIT (OUTPATIENT)
Dept: FAMILY MEDICINE CLINIC | Facility: MEDICAL CENTER | Age: 55
End: 2025-04-15
Payer: COMMERCIAL

## 2025-04-15 VITALS
RESPIRATION RATE: 18 BRPM | HEART RATE: 92 BPM | WEIGHT: 173.6 LBS | BODY MASS INDEX: 27.25 KG/M2 | HEIGHT: 67 IN | SYSTOLIC BLOOD PRESSURE: 110 MMHG | DIASTOLIC BLOOD PRESSURE: 70 MMHG | TEMPERATURE: 98 F | OXYGEN SATURATION: 97 %

## 2025-04-15 DIAGNOSIS — F17.211 NICOTINE DEPENDENCE, CIGARETTES, IN REMISSION: ICD-10-CM

## 2025-04-15 DIAGNOSIS — N18.2 STAGE 2 CHRONIC KIDNEY DISEASE: ICD-10-CM

## 2025-04-15 DIAGNOSIS — I10 PRIMARY HYPERTENSION: Primary | ICD-10-CM

## 2025-04-15 DIAGNOSIS — Z00.00 ANNUAL PHYSICAL EXAM: ICD-10-CM

## 2025-04-15 DIAGNOSIS — K21.9 GASTROESOPHAGEAL REFLUX DISEASE WITHOUT ESOPHAGITIS: ICD-10-CM

## 2025-04-15 DIAGNOSIS — Z12.11 SCREENING FOR COLORECTAL CANCER: ICD-10-CM

## 2025-04-15 DIAGNOSIS — E78.2 MIXED HYPERLIPIDEMIA: ICD-10-CM

## 2025-04-15 DIAGNOSIS — Z12.12 SCREENING FOR COLORECTAL CANCER: ICD-10-CM

## 2025-04-15 DIAGNOSIS — G47.33 OSA (OBSTRUCTIVE SLEEP APNEA): ICD-10-CM

## 2025-04-15 DIAGNOSIS — E21.3 HYPERPARATHYROIDISM (HCC): ICD-10-CM

## 2025-04-15 PROCEDURE — 99214 OFFICE O/P EST MOD 30 MIN: CPT | Performed by: INTERNAL MEDICINE

## 2025-04-15 PROCEDURE — 99396 PREV VISIT EST AGE 40-64: CPT | Performed by: INTERNAL MEDICINE

## 2025-04-15 NOTE — ASSESSMENT & PLAN NOTE
Lab Results   Component Value Date    EGFR 60 10/22/2024    EGFR 62 07/06/2024    EGFR 45 04/30/2024    CREATININE 1.34 (H) 10/22/2024    CREATININE 1.29 07/06/2024    CREATININE 1.69 (H) 04/30/2024     Follow-up with nephrology

## 2025-04-15 NOTE — PROGRESS NOTES
Adult Annual Physical  Name: Jeovany Paul      : 1970      MRN: [id number]  Encounter Provider: Justine Aly MD  Encounter Date: 4/15/2025   Encounter department: [facility]    :  Assessment & Plan  Primary hypertension  Blood pressure is stable, continue the present medications       Mixed hyperlipidemia  Continue Crestor       Gastroesophageal reflux disease without esophagitis  Continue omeprazole, symptoms are well-controlled with it       Stage 2 chronic kidney disease  Lab Results   Component Value Date    EGFR 60 10/22/2024    EGFR 62 2024    EGFR 45 2024    CREATININE 1.34 (H) 10/22/2024    CREATININE 1.29 2024    CREATININE 1.69 (H) 2024     Follow-up with nephrology       Hyperparathyroidism (HCC)  Stable       CORY (obstructive sleep apnea)  Was told to use CPAP regularly       Nicotine dependence, cigarettes, in remission    Orders:    CT lung screening program; Future    Screening for colorectal cancer    Orders:    Ambulatory Referral to Gastroenterology; Future    Annual physical exam             Preventive Screenings:  - Diabetes Screening: screening up-to-date  - Cholesterol Screening: screening not indicated and has hyperlipidemia   - Hepatitis C screening: risks/benefits discussed   - HIV screening: risks/benefits discussed   - Colon cancer screening: orders placed   - Lung cancer screening: orders placed   - Prostate cancer screening: screening up-to-date     Counseling/Anticipatory Guidance:  - Alcohol: discussed moderation in alcohol intake and recommendations for healthy alcohol use.   - Drug use: discussed harms of illicit drug use and how it can negatively impact mental/physical health.   - Tobacco use: discussed harms of tobacco use and management options for quitting.   - Dental health: discussed importance of regular tooth brushing, flossing, and dental visits.   - Sexual health: discussed sexually transmitted diseases, partner  selection, use of condoms, avoidance of unintended pregnancy, and contraceptive alternatives.   - Diet: discussed recommendations for a healthy/well-balanced diet.   - Exercise: the importance of regular exercise/physical activity was discussed. Recommend exercise 3-5 times per week for at least 30 minutes.   - Injury prevention: discussed safety/seat belts, safety helmets, smoke detectors, carbon monoxide detectors, and smoking near bedding or upholstery.       Depression Screening and Follow-up Plan: Patient was screened for depression during today's encounter. They screened negative with a PHQ-2 score of 0.          History of Present Illness   Patient is here for follow-up of hypertension, hyperlipidemia, GERD and sleep apnea.  He is taking all his medications regularly and doing very well.  He follows up with nephrology regularly  He uses his CPAP regularly  He needs to have a CT of the lung for screening as well as the colonoscopy.  Has a history of colitis off and on.        Adult Annual Physical:  Patient presents for annual physical.     Diet and Physical Activity:  - Diet/Nutrition: no special diet and limited junk food.  - Exercise: 1-2 times a week on average and moderate cardiovascular exercise. yadiel davis    Depression Screening:  - PHQ-2 Score: 0    General Health:  - Sleep: uses CPAP machine and 4-6 hours of sleep on average.  - Hearing: normal hearing bilateral ears.  - Vision: wears glasses.  - Dental: regular dental visits.     Health:  - History of STDs: no.   - Urinary symptoms: none.     Advanced Care Planning:    - Has a durable medical POA?: no      Review of Systems   Constitutional:  Negative for chills, diaphoresis, fatigue and fever.   HENT:  Negative for congestion, ear discharge, ear pain, hearing loss, postnasal drip, rhinorrhea, sinus pressure, sinus pain, sneezing, sore throat and voice change.    Eyes:  Negative for pain, discharge, redness and visual disturbance.  "  Respiratory:  Negative for cough, chest tightness, shortness of breath and wheezing.    Cardiovascular:  Negative for chest pain, palpitations and leg swelling.   Gastrointestinal:  Negative for abdominal distention, abdominal pain, blood in stool, constipation, diarrhea, nausea and vomiting.   Endocrine: Negative for cold intolerance, heat intolerance, polydipsia, polyphagia and polyuria.   Genitourinary:  Negative for dysuria, flank pain, frequency, hematuria and urgency.   Musculoskeletal:  Negative for arthralgias, back pain, gait problem, joint swelling, myalgias, neck pain and neck stiffness.   Skin:  Negative for rash.   Neurological:  Negative for dizziness, tremors, syncope, facial asymmetry, speech difficulty, weakness, light-headedness, numbness and headaches.   Hematological:  Does not bruise/bleed easily.   Psychiatric/Behavioral:  Negative for behavioral problems, confusion and sleep disturbance. The patient is not nervous/anxious.          Objective   /70 (BP Location: Left arm, Patient Position: Sitting, Cuff Size: Large)   Pulse 92   Temp 98 °F (36.7 °C) (Temporal)   Resp 18   Ht 5' 7\" (1.702 m)   Wt 78.7 kg (173 lb 9.6 oz)   SpO2 97%   BMI 27.19 kg/m²     Physical Exam  Constitutional:       General: He is not in acute distress.     Appearance: He is well-developed. He is not diaphoretic.   HENT:      Head: Normocephalic and atraumatic.      Right Ear: External ear normal.      Left Ear: External ear normal.      Nose: Nose normal.   Eyes:      General: No scleral icterus.        Right eye: No discharge.         Left eye: No discharge.      Conjunctiva/sclera: Conjunctivae normal.   Cardiovascular:      Rate and Rhythm: Normal rate and regular rhythm.      Heart sounds: Normal heart sounds. No murmur heard.     No friction rub. No gallop.   Pulmonary:      Effort: Pulmonary effort is normal. No respiratory distress.      Breath sounds: Normal breath sounds. No wheezing or rales. "   Abdominal:      General: Bowel sounds are normal. There is no distension.      Palpations: Abdomen is soft.      Tenderness: There is no abdominal tenderness. There is no guarding or rebound.   Musculoskeletal:         General: No tenderness.   Skin:     General: Skin is warm and dry.      Findings: No erythema or rash.   Neurological:      Mental Status: He is alert and oriented to person, place, and time.      Cranial Nerves: No cranial nerve deficit.      Sensory: No sensory deficit.      Motor: No abnormal muscle tone.   Psychiatric:         Behavior: Behavior normal.

## 2025-04-15 NOTE — PATIENT INSTRUCTIONS
"Patient Education     Routine physical for adults   The Basics   Written by the doctors and editors at [facility]   What is a physical? -- A physical is a routine visit, or \"check-up,\" with your doctor. You might also hear it called a \"wellness visit\" or \"preventive visit.\"  During each visit, the doctor will:   Ask about your physical and mental health   Ask about your habits, behaviors, and lifestyle   Do an exam   Give you vaccines if needed   Talk to you about any medicines you take   Give advice about your health   Answer your questions  Getting regular check-ups is an important part of taking care of your health. It can help your doctor find and treat any problems you have. But it's also important for preventing health problems.  A routine physical is different from a \"sick visit.\" A sick visit is when you see a doctor because of a health concern or problem. Since physicals are scheduled ahead of time, you can think about what you want to ask the doctor.  How often should I get a physical? -- It depends on your age and health. In general, for people age 21 years and older:   If you are younger than 50 years, you might be able to get a physical every 3 years.   If you are 50 years or older, your doctor might recommend a physical every year.  If you have an ongoing health condition, like diabetes or high blood pressure, your doctor will probably want to see you more often.  What happens during a physical? -- In general, each visit will include:   Physical exam - The doctor or nurse will check your height, weight, heart rate, and blood pressure. They will also look at your eyes and ears. They will ask about how you are feeling and whether you have any symptoms that bother you.   Medicines - It's a good idea to bring a list of all the medicines you take to each doctor visit. Your doctor will talk to you about your medicines and answer any questions. Tell them if you are having any side effects that bother you. You " "should also tell them if you are having trouble paying for any of your medicines.   Habits and behaviors - This includes:   Your diet   Your exercise habits   Whether you smoke, drink alcohol, or use drugs   Whether you are sexually active   Whether you feel safe at home  Your doctor will talk to you about things you can do to improve your health and lower your risk of health problems. They will also offer help and support. For example, if you want to quit smoking, they can give you advice and might prescribe medicines. If you want to improve your diet or get more physical activity, they can help you with this, too.   Lab tests, if needed - The tests you get will depend on your age and situation. For example, your doctor might want to check your:   Cholesterol   Blood sugar   Iron level   Vaccines - The recommended vaccines will depend on your age, health, and what vaccines you already had. Vaccines are very important because they can prevent certain serious or deadly infections.   Discussion of screening - \"Screening\" means checking for diseases or other health problems before they cause symptoms. Your doctor can recommend screening based on your age, risk, and preferences. This might include tests to check for:   Cancer, such as breast, prostate, cervical, ovarian, colorectal, prostate, lung, or skin cancer   Sexually transmitted infections, such as chlamydia and gonorrhea   Mental health conditions like depression and anxiety  Your doctor will talk to you about the different types of screening tests. They can help you decide which screenings to have. They can also explain what the results might mean.   Answering questions - The physical is a good time to ask the doctor or nurse questions about your health. If needed, they can refer you to other doctors or specialists, too.  Adults older than 65 years often need other care, too. As you get older, your doctor will talk to you about:   How to prevent falling at " home   Hearing or vision tests   Memory testing   How to take your medicines safely   Making sure that you have the help and support you need at home  All topics are updated as new evidence becomes available and our peer review process is complete.  This topic retrieved from [employer] on: May 02, 2024.  Topic 142744 Version 1.0  Release: 32.4.3 - C32.122  © 2024 UpToDate, Inc. and/or its affiliates. All rights reserved.  Consumer Information Use and Disclaimer   Disclaimer: This generalized information is a limited summary of diagnosis, treatment, and/or medication information. It is not meant to be comprehensive and should be used as a tool to help the user understand and/or assess potential diagnostic and treatment options. It does NOT include all information about conditions, treatments, medications, side effects, or risks that may apply to a specific patient. It is not intended to be medical advice or a substitute for the medical advice, diagnosis, or treatment of a health care provider based on the health care provider's examination and assessment of a patient's specific and unique circumstances. Patients must speak with a health care provider for complete information about their health, medical questions, and treatment options, including any risks or benefits regarding use of medications. This information does not endorse any treatments or medications as safe, effective, or approved for treating a specific patient. UpToDate, Inc. and its affiliates disclaim any warranty or liability relating to this information or the use thereof.The use of this information is governed by the Terms of Use, available at https://www.woltersKadmus Pharmaceuticalsuwer.com/en/know/clinical-effectiveness-terms. 2024© UpToDate, Inc. and its affiliates and/or licensors. All rights reserved.  Copyright   © 2024 UpToDate, Inc. and/or its affiliates. All rights reserved.

## 2025-04-25 ENCOUNTER — TELEPHONE (OUTPATIENT)
Dept: NEPHROLOGY | Facility: CLINIC | Age: 55
End: 2025-04-25

## 2025-04-28 ENCOUNTER — APPOINTMENT (OUTPATIENT)
Dept: LAB | Facility: CLINIC | Age: 55
End: 2025-04-28
Payer: COMMERCIAL

## 2025-04-28 DIAGNOSIS — N18.2 STAGE 2 CHRONIC KIDNEY DISEASE: ICD-10-CM

## 2025-04-28 DIAGNOSIS — E21.3 HYPERPARATHYROIDISM (HCC): ICD-10-CM

## 2025-04-28 DIAGNOSIS — I10 PRIMARY HYPERTENSION: ICD-10-CM

## 2025-04-28 DIAGNOSIS — Z00.8 HEALTH EXAMINATION IN POPULATION SURVEY: ICD-10-CM

## 2025-04-28 LAB
BACTERIA UR QL AUTO: ABNORMAL /HPF
BASOPHILS # BLD AUTO: 0.1 THOUSANDS/ÂΜL (ref 0–0.1)
BASOPHILS NFR BLD AUTO: 1 % (ref 0–1)
BILIRUB UR QL STRIP: NEGATIVE
CAOX CRY URNS QL MICRO: ABNORMAL /HPF
CLARITY UR: CLEAR
COLOR UR: ABNORMAL
EOSINOPHIL # BLD AUTO: 0.12 THOUSAND/ÂΜL (ref 0–0.61)
EOSINOPHIL NFR BLD AUTO: 2 % (ref 0–6)
ERYTHROCYTE [DISTWIDTH] IN BLOOD BY AUTOMATED COUNT: 11.5 % (ref 11.6–15.1)
GLUCOSE UR STRIP-MCNC: NEGATIVE MG/DL
HCT VFR BLD AUTO: 38.9 % (ref 36.5–49.3)
HGB BLD-MCNC: 13 G/DL (ref 12–17)
HGB UR QL STRIP.AUTO: NEGATIVE
IMM GRANULOCYTES # BLD AUTO: 0.06 THOUSAND/UL (ref 0–0.2)
IMM GRANULOCYTES NFR BLD AUTO: 1 % (ref 0–2)
KETONES UR STRIP-MCNC: NEGATIVE MG/DL
LEUKOCYTE ESTERASE UR QL STRIP: NEGATIVE
LYMPHOCYTES # BLD AUTO: 1.51 THOUSANDS/ÂΜL (ref 0.6–4.47)
LYMPHOCYTES NFR BLD AUTO: 22 % (ref 14–44)
MCH RBC QN AUTO: 32.7 PG (ref 26.8–34.3)
MCHC RBC AUTO-ENTMCNC: 33.4 G/DL (ref 31.4–37.4)
MCV RBC AUTO: 98 FL (ref 82–98)
MONOCYTES # BLD AUTO: 0.99 THOUSAND/ÂΜL (ref 0.17–1.22)
MONOCYTES NFR BLD AUTO: 14 % (ref 4–12)
MUCOUS THREADS UR QL AUTO: ABNORMAL
NEUTROPHILS # BLD AUTO: 4.13 THOUSANDS/ÂΜL (ref 1.85–7.62)
NEUTS SEG NFR BLD AUTO: 60 % (ref 43–75)
NITRITE UR QL STRIP: NEGATIVE
NON-SQ EPI CELLS URNS QL MICRO: ABNORMAL /HPF
NRBC BLD AUTO-RTO: 0 /100 WBCS
PH UR STRIP.AUTO: 6.5 [PH]
PLATELET # BLD AUTO: 161 THOUSANDS/UL (ref 149–390)
PMV BLD AUTO: 10.8 FL (ref 8.9–12.7)
PROT UR STRIP-MCNC: NEGATIVE MG/DL
RBC # BLD AUTO: 3.98 MILLION/UL (ref 3.88–5.62)
RBC #/AREA URNS AUTO: ABNORMAL /HPF
SP GR UR STRIP.AUTO: 1.01 (ref 1–1.03)
UROBILINOGEN UR STRIP-ACNC: <2 MG/DL
WBC # BLD AUTO: 6.91 THOUSAND/UL (ref 4.31–10.16)
WBC #/AREA URNS AUTO: ABNORMAL /HPF

## 2025-04-28 PROCEDURE — 83036 HEMOGLOBIN GLYCOSYLATED A1C: CPT

## 2025-04-28 PROCEDURE — 82570 ASSAY OF URINE CREATININE: CPT

## 2025-04-28 PROCEDURE — 82306 VITAMIN D 25 HYDROXY: CPT

## 2025-04-28 PROCEDURE — 83970 ASSAY OF PARATHORMONE: CPT

## 2025-04-28 PROCEDURE — 85025 COMPLETE CBC W/AUTO DIFF WBC: CPT

## 2025-04-28 PROCEDURE — 36415 COLL VENOUS BLD VENIPUNCTURE: CPT

## 2025-04-28 PROCEDURE — 80048 BASIC METABOLIC PNL TOTAL CA: CPT

## 2025-04-28 PROCEDURE — 81001 URINALYSIS AUTO W/SCOPE: CPT

## 2025-04-28 PROCEDURE — 80061 LIPID PANEL: CPT

## 2025-04-28 PROCEDURE — 82043 UR ALBUMIN QUANTITATIVE: CPT

## 2025-04-29 LAB
25(OH)D3 SERPL-MCNC: 57.6 NG/ML (ref 30–100)
ANION GAP SERPL CALCULATED.3IONS-SCNC: 11 MMOL/L (ref 4–13)
BUN SERPL-MCNC: 17 MG/DL (ref 5–25)
CALCIUM SERPL-MCNC: 9.1 MG/DL (ref 8.4–10.2)
CHLORIDE SERPL-SCNC: 96 MMOL/L (ref 96–108)
CHOLEST SERPL-MCNC: 179 MG/DL (ref ?–200)
CO2 SERPL-SCNC: 28 MMOL/L (ref 21–32)
CREAT SERPL-MCNC: 1.21 MG/DL (ref 0.6–1.3)
CREAT UR-MCNC: 122 MG/DL
EST. AVERAGE GLUCOSE BLD GHB EST-MCNC: 111 MG/DL
GFR SERPL CREATININE-BSD FRML MDRD: 67 ML/MIN/1.73SQ M
GLUCOSE P FAST SERPL-MCNC: 100 MG/DL (ref 65–99)
HBA1C MFR BLD: 5.5 %
HDLC SERPL-MCNC: 61 MG/DL
LDLC SERPL CALC-MCNC: 82 MG/DL (ref 0–100)
MICROALBUMIN UR-MCNC: 10.3 MG/L
MICROALBUMIN/CREAT 24H UR: 8 MG/G CREATININE (ref 0–30)
NONHDLC SERPL-MCNC: 118 MG/DL
POTASSIUM SERPL-SCNC: 3.9 MMOL/L (ref 3.5–5.3)
PTH-INTACT SERPL-MCNC: 62 PG/ML (ref 12–88)
SODIUM SERPL-SCNC: 135 MMOL/L (ref 135–147)
TRIGL SERPL-MCNC: 182 MG/DL (ref ?–150)

## 2025-05-06 ENCOUNTER — TELEPHONE (OUTPATIENT)
Dept: NEPHROLOGY | Facility: CLINIC | Age: 55
End: 2025-05-06

## 2025-05-06 NOTE — PROGRESS NOTES
NEPHROLOGY OFFICE NOTE    Patient: Jeovany Paul               Sex: male           YOB: 1970        Age:  55 y.o.       5/9/2025    ASSESSMENT/PLAN     Assessment & Plan  Stage 2 chronic kidney disease  Lab Results   Component Value Date    EGFR 67 04/28/2025    EGFR 60 10/22/2024    EGFR 62 07/06/2024    CREATININE 1.21 04/28/2025    CREATININE 1.34 (H) 10/22/2024    CREATININE 1.29 07/06/2024       Orders:    CBC and differential; Future    Basic metabolic panel; Future    Urinalysis with microscopic; Future    Albumin / creatinine urine ratio; Future    Basic metabolic panel; Future    After review of the medical record, it appears baseline creatinine level fluctuates around 1.3 - 1.6 dating back through September of 2021. Suspect etiology of chronic kidney disease is multifactorial in the setting of hypertensive nephrosclerosis.  He was also previously taking several herbal and vitamin supplements which he has discontinued in the interim.     Prior CT imaging of the abdomen obtained on 11/17/2020 noted kidneys were unremarkable with no evidence of hydronephrosis.    Most recent creatinine level was 1.21 with an EGFR of 67.  Most recent urinalysis showed no significant hematuria or proteinuria.  There were occasional calcium oxalate crystals noted.  Advised importance of hydration, limiting alcohol intake, low-salt diet, and adding lemon or lime juice to intake of water.    Will plan for follow-up in 9 months and check BMP and urine studies prior to that visit.  Patient would like to restart ashwagandha given worsening stress at home, will check BMP at 3 months to ensure creatinine stability with restarting supplement.  Primary hypertension    Orders:    Basic metabolic panel; Future    Urinalysis with microscopic; Future    Albumin / creatinine urine ratio; Future    Currently maintained on losartan 100 mg daily and triamterene - hydrochlorothiazide 27.5 - 25 mg daily.  Blood pressure within ideal  range on the current regimen.  Hyperparathyroidism (HCC)  Resolved.  Calcium 9.1, vitamin D 57.6, PTH 62.  All lab values are now back within normal limits.    BACKGROUND     I had the pleasure of seeing Jeovany Paul in the nephrology office today for follow-up. He has a past medical history significant for hypertension, GERD, CORY on CPAP, and chronic kidney disease.      He is following with Pulmonology for management of CORY, currently maintained on CPAP.      He has underlying GERD, currently maintained on omeprazole.     Does report a history of EtOH use but has cut back significantly.  He does endorse use of over-the-counter supplements such as turmeric and ashwagandha, he stopped aswagandha in the interim but was taking smaller dose of turmeric.  He also denies routine use of over-the-counter NSAIDs.     He has a history of hypertension, maintained on losartan and triamterene - hydrochlorothiazide.  Actively monitoring blood pressure at home.    He reports increased stress at home dealing with his job.  He does endorse EtOH use, though has decreased from several years back.    The last blood work was done on 4/28/2025, which we have reviewed together.    SUBJECTIVE     He currently has no major complaints at this time apart from above HPI.    REVIEW OF SYSTEMS     Review of Systems   Constitutional:  Negative for activity change and fever.   HENT:  Negative for trouble swallowing.    Respiratory:  Negative for shortness of breath.    Cardiovascular:  Negative for leg swelling.   Gastrointestinal:  Negative for nausea and vomiting.   Genitourinary:  Negative for difficulty urinating, dysuria, frequency and hematuria.   Musculoskeletal:  Negative for back pain.   Skin:  Negative for pallor.   Neurological:  Negative for dizziness, syncope, weakness and light-headedness.   Psychiatric/Behavioral:  Negative for sleep disturbance. The patient is not nervous/anxious.        OBJECTIVE     Current Weight: Weight -  Scale: 76.2 kg (168 lb)  Vitals:    25 0912   BP: 114/80   Pulse: 92   Resp: 16   Temp: (!) 97.4 °F (36.3 °C)   SpO2: 99%     Body mass index is 26.31 kg/m².    CURRENT MEDICATIONS       Current Outpatient Medications:     losartan (COZAAR) 100 MG tablet, take 1 tablet by mouth once daily, Disp: 90 tablet, Rfl: 1    omeprazole (PriLOSEC) 20 mg delayed release capsule, Take 1 capsule by mouth daily, Disp: , Rfl:     rosuvastatin (CRESTOR) 5 mg tablet, take 1 tablet by mouth once daily, Disp: 90 tablet, Rfl: 1    triamterene-hydrochlorothiazide (MAXZIDE-25) 37.5-25 mg per tablet, take 1 tablet by mouth once daily, Disp: 90 tablet, Rfl: 1      PAST MEDICAL HISTORY     Past Medical History:   Diagnosis Date    Allergic     Chronic kidney disease     Episode of Reduced GFR     GERD (gastroesophageal reflux disease)     Hypertension     CORY (obstructive sleep apnea)     Sleep apnea        PAST SURGICAL HISTORY     Past Surgical History:   Procedure Laterality Date    CERVICAL DISC SURGERY      SPINE SURGERY      TONSILLECTOMY         ALLERGIES     Allergies   Allergen Reactions    Sulfa Antibiotics        SOCIAL HISTORY     Social History     Substance and Sexual Activity   Alcohol Use Yes    Alcohol/week: 20.0 standard drinks of alcohol    Types: 20 Cans of beer per week    Comment: 3-4 beer daily     Social History     Substance and Sexual Activity   Drug Use No     Social History     Tobacco Use   Smoking Status Former    Current packs/day: 0.00    Average packs/day: 1 pack/day for 25.2 years (25.2 ttl pk-yrs)    Types: Cigarettes    Start date:     Quit date: 3/15/2019    Years since quittin.1    Passive exposure: Past   Smokeless Tobacco Never       FAMILY HISTORY     Family History   Problem Relation Age of Onset    Breast cancer Mother     COPD Father     Alcohol abuse Father     Coronary artery disease Father     Hypertension Father     Thyroid cancer Sister     Multiple sclerosis Sister      Alcohol abuse Brother     No Known Problems Son     Hypertension Family         Benign essential hypertension     Heart disease Family     Lung disease Family     Cancer Family     Cancer Paternal Grandfather         Lung    Hypertension Paternal Grandfather     Lung cancer Paternal Grandfather     Heart failure Maternal Grandfather        PHYSICAL EXAMINATION     Physical Exam  Vitals reviewed.   Constitutional:       General: He is not in acute distress.  HENT:      Head: Normocephalic.      Mouth/Throat:      Lips: Pink.      Mouth: Mucous membranes are moist.   Eyes:      General: Lids are normal. No scleral icterus.  Cardiovascular:      Rate and Rhythm: Normal rate and regular rhythm.      Heart sounds: S1 normal and S2 normal.   Pulmonary:      Effort: Pulmonary effort is normal. No accessory muscle usage or respiratory distress.      Breath sounds: Normal breath sounds.   Abdominal:      General: There is no distension.      Tenderness: There is no abdominal tenderness.   Musculoskeletal:      Cervical back: Normal range of motion and neck supple. No tenderness.      Right lower leg: No edema.      Left lower leg: No edema.   Skin:     General: Skin is warm.      Coloration: Skin is not cyanotic or jaundiced.   Neurological:      General: No focal deficit present.      Mental Status: He is alert and oriented to person, place, and time.   Psychiatric:         Attention and Perception: Attention normal.         Speech: Speech normal.         Behavior: Behavior is cooperative.           LAB RESULTS     BMP/20 8/2025:  Sodium 135  Potassium 3.9  Chloride 96  CO2 28  Anion gap 11  BUN 17  Creatinine 1.21  Glucose 100  Calcium 9.1  eGFR 67      RADIOLOGY RESULTS      No results found for this or any previous visit.    Results for orders placed during the hospital encounter of 11/17/20    XR chest pa & lateral    Narrative  CHEST    INDICATION:   cp.    COMPARISON:  Two-view chest 7/23/2020    EXAM  "PERFORMED/VIEWS:  XR CHEST PA & LATERAL      FINDINGS:  Lower cervical spine fixation hardware.    Cardiomediastinal silhouette appears unremarkable.    The lungs are clear.  No pneumothorax or pleural effusion.    Osseous structures appear within normal limits for patient age.    Impression  No acute cardiopulmonary disease.            Workstation performed: [id number]    CT abdomen and pelvis 11/17/2020:  FINDINGS:     ABDOMEN     LOWER CHEST:  No clinically significant abnormality identified in the visualized lower chest.     LIVER/BILIARY TREE:  Mild to moderate steatosis.     GALLBLADDER:  No calcified gallstones. No pericholecystic inflammatory change.     SPLEEN:  Unremarkable.     PANCREAS:  Unremarkable.     ADRENAL GLANDS:  Unremarkable.     KIDNEYS/URETERS:  Unremarkable. No hydronephrosis.     STOMACH AND BOWEL:  Unremarkable.     APPENDIX:  No findings to suggest appendicitis.     ABDOMINOPELVIC CAVITY:  No ascites.  No pneumoperitoneum.  No lymphadenopathy.     VESSELS:  Unremarkable for patient's age.     PELVIS     REPRODUCTIVE ORGANS:  Unremarkable for patient's age.     URINARY BLADDER:  Unremarkable.     ABDOMINAL WALL/INGUINAL REGIONS:  Unremarkable.     OSSEOUS STRUCTURES:  No acute fracture or destructive osseous lesion.     IMPRESSION:     No acute abnormality identified.     Hepatic steatosis.    Recommend Nephrology follow-up in 9 months.    KATHY King  Nephrology  5/9/2025      Portions of the record may have been created with voice recognition software. Occasional wrong word or \"sound a like\" substitutions may have occurred due to the inherent limitations of voice recognition software. Read the chart carefully and recognize, using context, where substitutions have occurred.   "

## 2025-05-08 NOTE — ASSESSMENT & PLAN NOTE
Orders:    Basic metabolic panel; Future    Urinalysis with microscopic; Future    Albumin / creatinine urine ratio; Future    Currently maintained on losartan 100 mg daily and triamterene - hydrochlorothiazide 27.5 - 25 mg daily.  Blood pressure within ideal range on the current regimen.

## 2025-05-08 NOTE — ASSESSMENT & PLAN NOTE
Lab Results   Component Value Date    EGFR 67 04/28/2025    EGFR 60 10/22/2024    EGFR 62 07/06/2024    CREATININE 1.21 04/28/2025    CREATININE 1.34 (H) 10/22/2024    CREATININE 1.29 07/06/2024       Orders:    CBC and differential; Future    Basic metabolic panel; Future    Urinalysis with microscopic; Future    Albumin / creatinine urine ratio; Future    Basic metabolic panel; Future    After review of the medical record, it appears baseline creatinine level fluctuates around 1.3 - 1.6 dating back through September of 2021. Suspect etiology of chronic kidney disease is multifactorial in the setting of hypertensive nephrosclerosis.  He was also previously taking several herbal and vitamin supplements which he has discontinued in the interim.     Prior CT imaging of the abdomen obtained on 11/17/2020 noted kidneys were unremarkable with no evidence of hydronephrosis.    Most recent creatinine level was 1.21 with an EGFR of 67.  Most recent urinalysis showed no significant hematuria or proteinuria.  There were occasional calcium oxalate crystals noted.  Advised importance of hydration, limiting alcohol intake, low-salt diet, and adding lemon or lime juice to intake of water.    Will plan for follow-up in 9 months and check BMP and urine studies prior to that visit.  Patient would like to restart ashwagandha given worsening stress at home, will check BMP at 3 months to ensure creatinine stability with restarting supplement.

## 2025-05-08 NOTE — ASSESSMENT & PLAN NOTE
Resolved.  Calcium 9.1, vitamin D 57.6, PTH 62.  All lab values are now back within normal limits.

## 2025-05-09 ENCOUNTER — TELEPHONE (OUTPATIENT)
Dept: NEPHROLOGY | Facility: CLINIC | Age: 55
End: 2025-05-09

## 2025-05-09 ENCOUNTER — OFFICE VISIT (OUTPATIENT)
Dept: NEPHROLOGY | Facility: CLINIC | Age: 55
End: 2025-05-09
Payer: COMMERCIAL

## 2025-05-09 VITALS
DIASTOLIC BLOOD PRESSURE: 80 MMHG | TEMPERATURE: 97.4 F | OXYGEN SATURATION: 99 % | RESPIRATION RATE: 16 BRPM | SYSTOLIC BLOOD PRESSURE: 114 MMHG | BODY MASS INDEX: 26.37 KG/M2 | HEIGHT: 67 IN | HEART RATE: 92 BPM | WEIGHT: 168 LBS

## 2025-05-09 DIAGNOSIS — E21.3 HYPERPARATHYROIDISM (HCC): ICD-10-CM

## 2025-05-09 DIAGNOSIS — I10 PRIMARY HYPERTENSION: ICD-10-CM

## 2025-05-09 DIAGNOSIS — N18.2 STAGE 2 CHRONIC KIDNEY DISEASE: Primary | ICD-10-CM

## 2025-05-09 PROCEDURE — 99214 OFFICE O/P EST MOD 30 MIN: CPT

## 2025-05-09 NOTE — PATIENT INSTRUCTIONS
"Your kidney function is very stable at present time.  Continue to stay hydrated with up to 64 ounces of water daily.  I recommend that you avoid use of NSAIDs (ibuprofen, Motrin, Aleve, naproxen, Advil)  Continue to monitor blood pressure at home, goal average 130/80 or less.  If you want to start ashwagandha, we will check a BMP at 3 months to ensure renal function stable.   We will see you back in 9 months for follow-up.    Patient Education     Chronic kidney disease   The Basics   Written by the doctors and editors at [facility]   What is chronic kidney disease? -- Chronic kidney disease, or \"CKD,\" is when the kidneys stop working as well as they should. When they are working normally, the kidneys filter blood and remove waste and excess salt and water (figure 1).  In people with CKD, the kidneys slowly lose the ability to filter blood. In time, the kidneys can stop working completely. That is why it is so important to keep CKD from getting worse.  What are the symptoms of CKD? -- At first, CKD causes no symptoms. As the disease gets worse, it can:   Make your feet, ankles, or legs swell (called \"edema\")   Give you high blood pressure   Make you very tired   Damage your bones  Will I need tests? -- Yes. Your doctor will want to see you regularly. You will probably have appointments at least once a year, and you will get regular tests to check your kidneys. These include blood and urine tests.  If your CKD gets worse over time, you will probably need to see a \"nephrologist.\" This is a doctor who takes care of people with kidney disease.  Is there anything I can do to keep my kidneys from getting worse? -- Yes. If you have CKD, you can protect your kidneys if you:   Take all of your prescribed medicines every day, and follow all of your doctor's instructions for how to take them.   Keep your blood sugar in a healthy range, if you have diabetes.   Change your diet, if your doctor or nurse recommends to. They might " "suggest working with a dietitian (nutrition expert).   Quit smoking, if you smoke.   Lose weight, if you have excess body weight.   Avoid medicines that can harm the kidneys - One example is nonsteroidal antiinflammatory drugs (\"NSAIDs\"). These medicines include ibuprofen (sample brand names: Advil, Motrin) and naproxen (sample brand name: Aleve). There are other medicines that people with CKD need to avoid, too. Check with your doctor, nurse, or kidney specialist before starting any new medicines or supplements, even those you can buy without a prescription.  How is CKD treated? -- People in the early stages of CKD can take medicines to keep the disease from getting worse. For example, many people with CKD should take medicines known as \"ACE inhibitors\" or \"angiotensin receptor blockers.\" If your doctor or nurse prescribes these medicines, it is very important that you take them every day as directed. If they cause side effects or cost too much, tell your doctor or nurse. They might have solutions to offer.  What happens if my kidneys stop working completely? -- If your kidneys can no longer filter blood properly, you can choose between 3 different treatments to take over their job. Your choices are:   Kidney transplant - After transplant surgery, the new kidney can do the job of your own kidneys. If you have a kidney transplant, you will need to take medicines for the rest of your life to keep your body from reacting badly to the new kidney. (You only need 1 kidney to live.)   Hemodialysis - This is a procedure in which a dialysis machine takes over the job of the kidneys. The machine pumps blood out of the body, filters it, and returns it to the body. If you choose hemodialysis, you will need to be hooked up to the machine at least 3 times a week for several hours for the rest of your life. Before you start, you will also need to have surgery to prepare a blood vessel for attachment to the machine.   Peritoneal " dialysis - This involves piping a special fluid into the belly every day. If you choose peritoneal dialysis, you will need surgery to have a tube implanted in your belly. Then, you will have to learn how to pipe the fluid in and out through that tube.  How do I choose between the different treatment options? -- You and your doctor will need to work together to find a treatment that's right for you. Kidney transplant surgery is usually the best option for most people. But often, there are no kidneys available for transplant.  Ask your doctor to explain all of your options and how they might work for you. Then, talk openly with them about how you feel about all of the options. You might even decide that you do not want any treatment. That is your choice.  All topics are updated as new evidence becomes available and our peer review process is complete.  This topic retrieved from [employer] on: May 18, 2024.  Topic 84519 Version 34.0  Release: 32.4.3 - C32.137  © 2024 UpToDate, Inc. and/or its affiliates. All rights reserved.  figure 1: Anatomy of the urinary tract     Urine is made by the kidneys. It passes from the kidneys into the bladder through 2 tubes called the ureters. Then, it leaves the bladder through another tube called the urethra.  Graphic 36990 Version 8.0  Consumer Information Use and Disclaimer   Disclaimer: This generalized information is a limited summary of diagnosis, treatment, and/or medication information. It is not meant to be comprehensive and should be used as a tool to help the user understand and/or assess potential diagnostic and treatment options. It does NOT include all information about conditions, treatments, medications, side effects, or risks that may apply to a specific patient. It is not intended to be medical advice or a substitute for the medical advice, diagnosis, or treatment of a health care provider based on the health care provider's examination and assessment of a patient's  specific and unique circumstances. Patients must speak with a health care provider for complete information about their health, medical questions, and treatment options, including any risks or benefits regarding use of medications. This information does not endorse any treatments or medications as safe, effective, or approved for treating a specific patient. UpToDate, Inc. and its affiliates disclaim any warranty or liability relating to this information or the use thereof.The use of this information is governed by the Terms of Use, available at https://www.[employer] Studiosuwer.com/en/know/clinical-effectiveness-terms. 2024© UpToDate, Inc. and its affiliates and/or licensors. All rights reserved.  Copyright   © 2024 UpToDate, Inc. and/or its affiliates. All rights reserved.

## 2025-05-09 NOTE — LETTER
May 9, 2025     Justine Aly MD  [address]  [address]  [facility] [address]    Patient: Jeovany Paul   YOB: 1970   Date of Visit: 5/9/2025       Dear Dr. Justine Aly MD:    Thank you for referring Jeovany Paul to me for evaluation. Below are my notes for this consultation.    If you have questions, please do not hesitate to call me. I look forward to following your patient along with you.         Sincerely,        KATHY King        CC: No Recipients    KATHY King  5/9/2025  9:41 AM  Sign when Signing Visit  NEPHROLOGY OFFICE NOTE    Patient: Jeovany Paul               Sex: male           YOB: 1970        Age:  55 y.o.       5/9/2025    ASSESSMENT/PLAN     Assessment & Plan  Stage 2 chronic kidney disease  Lab Results   Component Value Date    EGFR 67 04/28/2025    EGFR 60 10/22/2024    EGFR 62 07/06/2024    CREATININE 1.21 04/28/2025    CREATININE 1.34 (H) 10/22/2024    CREATININE 1.29 07/06/2024       Orders:  •  CBC and differential; Future  •  Basic metabolic panel; Future  •  Urinalysis with microscopic; Future  •  Albumin / creatinine urine ratio; Future  •  Basic metabolic panel; Future    After review of the medical record, it appears baseline creatinine level fluctuates around 1.3 - 1.6 dating back through September of 2021. Suspect etiology of chronic kidney disease is multifactorial in the setting of hypertensive nephrosclerosis.  He was also previously taking several herbal and vitamin supplements which he has discontinued in the interim.     Prior CT imaging of the abdomen obtained on 11/17/2020 noted kidneys were unremarkable with no evidence of hydronephrosis.    Most recent creatinine level was 1.21 with an EGFR of 67.  Most recent urinalysis showed no significant hematuria or proteinuria.  There were occasional calcium oxalate crystals noted.  Advised importance of hydration, limiting alcohol intake, low-salt diet, and  adding lemon or lime juice to intake of water.    Will plan for follow-up in 9 months and check BMP and urine studies prior to that visit.  Patient would like to restart ashwagandha given worsening stress at home, will check BMP at 3 months to ensure creatinine stability with restarting supplement.  Primary hypertension    Orders:  •  Basic metabolic panel; Future  •  Urinalysis with microscopic; Future  •  Albumin / creatinine urine ratio; Future    Currently maintained on losartan 100 mg daily and triamterene - hydrochlorothiazide 27.5 - 25 mg daily.  Blood pressure within ideal range on the current regimen.  Hyperparathyroidism (HCC)  Resolved.  Calcium 9.1, vitamin D 57.6, PTH 62.  All lab values are now back within normal limits.    BACKGROUND     I had the pleasure of seeing Jeovany Paul in the nephrology office today for follow-up. He has a past medical history significant for hypertension, GERD, CORY on CPAP, and chronic kidney disease.      He is following with Pulmonology for management of CORY, currently maintained on CPAP.      He has underlying GERD, currently maintained on omeprazole.     Does report a history of EtOH use but has cut back significantly.  He does endorse use of over-the-counter supplements such as turmeric and ashwagandha, he stopped aswagandha in the interim but was taking smaller dose of turmeric.  He also denies routine use of over-the-counter NSAIDs.     He has a history of hypertension, maintained on losartan and triamterene - hydrochlorothiazide.  Actively monitoring blood pressure at home.    He reports increased stress at home dealing with his job.  He does endorse EtOH use, though has decreased from several years back.    The last blood work was done on 4/28/2025, which we have reviewed together.    SUBJECTIVE     He currently has no major complaints at this time apart from above HPI.    REVIEW OF SYSTEMS     Review of Systems   Constitutional:  Negative for activity change and  fever.   HENT:  Negative for trouble swallowing.    Respiratory:  Negative for shortness of breath.    Cardiovascular:  Negative for leg swelling.   Gastrointestinal:  Negative for nausea and vomiting.   Genitourinary:  Negative for difficulty urinating, dysuria, frequency and hematuria.   Musculoskeletal:  Negative for back pain.   Skin:  Negative for pallor.   Neurological:  Negative for dizziness, syncope, weakness and light-headedness.   Psychiatric/Behavioral:  Negative for sleep disturbance. The patient is not nervous/anxious.        OBJECTIVE     Current Weight: Weight - Scale: 76.2 kg (168 lb)  Vitals:    05/09/25 0912   BP: 114/80   Pulse: 92   Resp: 16   Temp: (!) 97.4 °F (36.3 °C)   SpO2: 99%     Body mass index is 26.31 kg/m².    CURRENT MEDICATIONS       Current Outpatient Medications:   •  losartan (COZAAR) 100 MG tablet, take 1 tablet by mouth once daily, Disp: 90 tablet, Rfl: 1  •  omeprazole (PriLOSEC) 20 mg delayed release capsule, Take 1 capsule by mouth daily, Disp: , Rfl:   •  rosuvastatin (CRESTOR) 5 mg tablet, take 1 tablet by mouth once daily, Disp: 90 tablet, Rfl: 1  •  triamterene-hydrochlorothiazide (MAXZIDE-25) 37.5-25 mg per tablet, take 1 tablet by mouth once daily, Disp: 90 tablet, Rfl: 1      PAST MEDICAL HISTORY     Past Medical History:   Diagnosis Date   • Allergic    • Chronic kidney disease     Episode of Reduced GFR 2017   • GERD (gastroesophageal reflux disease)    • Hypertension    • CORY (obstructive sleep apnea)    • Sleep apnea        PAST SURGICAL HISTORY     Past Surgical History:   Procedure Laterality Date   • CERVICAL DISC SURGERY     • SPINE SURGERY  2000   • TONSILLECTOMY  1978       ALLERGIES     Allergies   Allergen Reactions   • Sulfa Antibiotics        SOCIAL HISTORY     Social History     Substance and Sexual Activity   Alcohol Use Yes   • Alcohol/week: 20.0 standard drinks of alcohol   • Types: 20 Cans of beer per week    Comment: 3-4 beer daily     Social  History     Substance and Sexual Activity   Drug Use No     Social History     Tobacco Use   Smoking Status Former   • Current packs/day: 0.00   • Average packs/day: 1 pack/day for 25.2 years (25.2 ttl pk-yrs)   • Types: Cigarettes   • Start date:    • Quit date: 3/15/2019   • Years since quittin.1   • Passive exposure: Past   Smokeless Tobacco Never       FAMILY HISTORY     Family History   Problem Relation Age of Onset   • Breast cancer Mother    • COPD Father    • Alcohol abuse Father    • Coronary artery disease Father    • Hypertension Father    • Thyroid cancer Sister    • Multiple sclerosis Sister    • Alcohol abuse Brother    • No Known Problems Son    • Hypertension Family         Benign essential hypertension    • Heart disease Family    • Lung disease Family    • Cancer Family    • Cancer Paternal Grandfather         Lung   • Hypertension Paternal Grandfather    • Lung cancer Paternal Grandfather    • Heart failure Maternal Grandfather        PHYSICAL EXAMINATION     Physical Exam  Vitals reviewed.   Constitutional:       General: He is not in acute distress.  HENT:      Head: Normocephalic.      Mouth/Throat:      Lips: Pink.      Mouth: Mucous membranes are moist.   Eyes:      General: Lids are normal. No scleral icterus.  Cardiovascular:      Rate and Rhythm: Normal rate and regular rhythm.      Heart sounds: S1 normal and S2 normal.   Pulmonary:      Effort: Pulmonary effort is normal. No accessory muscle usage or respiratory distress.      Breath sounds: Normal breath sounds.   Abdominal:      General: There is no distension.      Tenderness: There is no abdominal tenderness.   Musculoskeletal:      Cervical back: Normal range of motion and neck supple. No tenderness.      Right lower leg: No edema.      Left lower leg: No edema.   Skin:     General: Skin is warm.      Coloration: Skin is not cyanotic or jaundiced.   Neurological:      General: No focal deficit present.      Mental Status: He  is alert and oriented to person, place, and time.   Psychiatric:         Attention and Perception: Attention normal.         Speech: Speech normal.         Behavior: Behavior is cooperative.           LAB RESULTS     BMP/20 8/2025:  Sodium 135  Potassium 3.9  Chloride 96  CO2 28  Anion gap 11  BUN 17  Creatinine 1.21  Glucose 100  Calcium 9.1  eGFR 67      RADIOLOGY RESULTS      No results found for this or any previous visit.    Results for orders placed during the hospital encounter of 11/17/20    XR chest pa & lateral    Narrative  CHEST    INDICATION:   cp.    COMPARISON:  Two-view chest 7/23/2020    EXAM PERFORMED/VIEWS:  XR CHEST PA & LATERAL      FINDINGS:  Lower cervical spine fixation hardware.    Cardiomediastinal silhouette appears unremarkable.    The lungs are clear.  No pneumothorax or pleural effusion.    Osseous structures appear within normal limits for patient age.    Impression  No acute cardiopulmonary disease.            Workstation performed: [id number]    CT abdomen and pelvis 11/17/2020:  FINDINGS:     ABDOMEN     LOWER CHEST:  No clinically significant abnormality identified in the visualized lower chest.     LIVER/BILIARY TREE:  Mild to moderate steatosis.     GALLBLADDER:  No calcified gallstones. No pericholecystic inflammatory change.     SPLEEN:  Unremarkable.     PANCREAS:  Unremarkable.     ADRENAL GLANDS:  Unremarkable.     KIDNEYS/URETERS:  Unremarkable. No hydronephrosis.     STOMACH AND BOWEL:  Unremarkable.     APPENDIX:  No findings to suggest appendicitis.     ABDOMINOPELVIC CAVITY:  No ascites.  No pneumoperitoneum.  No lymphadenopathy.     VESSELS:  Unremarkable for patient's age.     PELVIS     REPRODUCTIVE ORGANS:  Unremarkable for patient's age.     URINARY BLADDER:  Unremarkable.     ABDOMINAL WALL/INGUINAL REGIONS:  Unremarkable.     OSSEOUS STRUCTURES:  No acute fracture or destructive osseous lesion.     IMPRESSION:     No acute abnormality identified.     Hepatic  "steatosis.    Recommend Nephrology follow-up in 9 months.    KATHY King  Nephrology  5/9/2025      Portions of the record may have been created with voice recognition software. Occasional wrong word or \"sound a like\" substitutions may have occurred due to the inherent limitations of voice recognition software. Read the chart carefully and recognize, using context, where substitutions have occurred.   "

## 2025-05-09 NOTE — TELEPHONE ENCOUNTER
I called [facility] @ [phone] (Automated System) to check eligible for the patient and as of 5/9/2025 the patient has current active coverage as of 7/1/2024. Sonia Mcfadden, .

## 2025-06-15 ENCOUNTER — HOSPITAL ENCOUNTER (EMERGENCY)
Facility: HOSPITAL | Age: 55
Discharge: HOME | End: 2025-06-16
Attending: EMERGENCY MEDICINE
Payer: COMMERCIAL

## 2025-06-15 ENCOUNTER — APPOINTMENT (EMERGENCY)
Dept: CT IMAGING | Facility: HOSPITAL | Age: 55
End: 2025-06-15
Payer: COMMERCIAL

## 2025-06-15 DIAGNOSIS — R51.9 HEADACHE: Primary | ICD-10-CM

## 2025-06-15 DIAGNOSIS — I77.3 FIBROMUSCULAR DYSPLASIA OF CAROTID ARTERY (HCC): ICD-10-CM

## 2025-06-15 DIAGNOSIS — H53.9 TRANSIENT VISUAL DISTURBANCE, LEFT: ICD-10-CM

## 2025-06-15 LAB
ANION GAP SERPL CALCULATED.3IONS-SCNC: 8 MMOL/L (ref 4–13)
BASOPHILS # BLD AUTO: 0.1 THOUSANDS/ÂΜL (ref 0–0.1)
BASOPHILS NFR BLD AUTO: 2 % (ref 0–1)
BUN SERPL-MCNC: 18 MG/DL (ref 5–25)
CALCIUM SERPL-MCNC: 9.3 MG/DL (ref 8.4–10.2)
CHLORIDE SERPL-SCNC: 103 MMOL/L (ref 96–108)
CO2 SERPL-SCNC: 26 MMOL/L (ref 21–32)
CREAT SERPL-MCNC: 1.2 MG/DL (ref 0.6–1.3)
EOSINOPHIL # BLD AUTO: 0.12 THOUSAND/ÂΜL (ref 0–0.61)
EOSINOPHIL NFR BLD AUTO: 2 % (ref 0–6)
ERYTHROCYTE [DISTWIDTH] IN BLOOD BY AUTOMATED COUNT: 12 % (ref 11.6–15.1)
ERYTHROCYTE [SEDIMENTATION RATE] IN BLOOD: <1 MM/HOUR (ref 0–19)
GFR SERPL CREATININE-BSD FRML MDRD: 68 ML/MIN/1.73SQ M
GLUCOSE SERPL-MCNC: 101 MG/DL (ref 65–140)
HCT VFR BLD AUTO: 37.4 % (ref 36.5–49.3)
HGB BLD-MCNC: 13 G/DL (ref 12–17)
IMM GRANULOCYTES # BLD AUTO: 0.06 THOUSAND/UL (ref 0–0.2)
IMM GRANULOCYTES NFR BLD AUTO: 1 % (ref 0–2)
LYMPHOCYTES # BLD AUTO: 1.87 THOUSANDS/ÂΜL (ref 0.6–4.47)
LYMPHOCYTES NFR BLD AUTO: 32 % (ref 14–44)
MCH RBC QN AUTO: 33.8 PG (ref 26.8–34.3)
MCHC RBC AUTO-ENTMCNC: 34.8 G/DL (ref 31.4–37.4)
MCV RBC AUTO: 97 FL (ref 82–98)
MONOCYTES # BLD AUTO: 0.75 THOUSAND/ÂΜL (ref 0.17–1.22)
MONOCYTES NFR BLD AUTO: 13 % (ref 4–12)
NEUTROPHILS # BLD AUTO: 2.93 THOUSANDS/ÂΜL (ref 1.85–7.62)
NEUTS SEG NFR BLD AUTO: 50 % (ref 43–75)
NRBC BLD AUTO-RTO: 0 /100 WBCS
PLATELET # BLD AUTO: 143 THOUSANDS/UL (ref 149–390)
PMV BLD AUTO: 10 FL (ref 8.9–12.7)
POTASSIUM SERPL-SCNC: 3.8 MMOL/L (ref 3.5–5.3)
RBC # BLD AUTO: 3.85 MILLION/UL (ref 3.88–5.62)
SODIUM SERPL-SCNC: 137 MMOL/L (ref 135–147)
WBC # BLD AUTO: 5.83 THOUSAND/UL (ref 4.31–10.16)

## 2025-06-15 PROCEDURE — 96374 THER/PROPH/DIAG INJ IV PUSH: CPT

## 2025-06-15 PROCEDURE — 36415 COLL VENOUS BLD VENIPUNCTURE: CPT | Performed by: EMERGENCY MEDICINE

## 2025-06-15 PROCEDURE — 85025 COMPLETE CBC W/AUTO DIFF WBC: CPT | Performed by: EMERGENCY MEDICINE

## 2025-06-15 PROCEDURE — 70496 CT ANGIOGRAPHY HEAD: CPT

## 2025-06-15 PROCEDURE — 99283 EMERGENCY DEPT VISIT LOW MDM: CPT

## 2025-06-15 PROCEDURE — 96375 TX/PRO/DX INJ NEW DRUG ADDON: CPT

## 2025-06-15 PROCEDURE — 85652 RBC SED RATE AUTOMATED: CPT | Performed by: EMERGENCY MEDICINE

## 2025-06-15 PROCEDURE — 80048 BASIC METABOLIC PNL TOTAL CA: CPT | Performed by: EMERGENCY MEDICINE

## 2025-06-15 PROCEDURE — 99285 EMERGENCY DEPT VISIT HI MDM: CPT | Performed by: EMERGENCY MEDICINE

## 2025-06-15 PROCEDURE — 70498 CT ANGIOGRAPHY NECK: CPT

## 2025-06-15 RX ORDER — ACETAMINOPHEN 325 MG/1
975 TABLET ORAL ONCE
Status: COMPLETED | OUTPATIENT
Start: 2025-06-15 | End: 2025-06-15

## 2025-06-15 RX ORDER — METOCLOPRAMIDE HYDROCHLORIDE 5 MG/ML
10 INJECTION INTRAMUSCULAR; INTRAVENOUS ONCE
Status: COMPLETED | OUTPATIENT
Start: 2025-06-15 | End: 2025-06-15

## 2025-06-15 RX ORDER — TETRACAINE HYDROCHLORIDE 5 MG/ML
2 SOLUTION OPHTHALMIC ONCE
Status: COMPLETED | OUTPATIENT
Start: 2025-06-15 | End: 2025-06-15

## 2025-06-15 RX ORDER — DIPHENHYDRAMINE HYDROCHLORIDE 50 MG/ML
25 INJECTION, SOLUTION INTRAMUSCULAR; INTRAVENOUS ONCE
Status: COMPLETED | OUTPATIENT
Start: 2025-06-15 | End: 2025-06-15

## 2025-06-15 RX ADMIN — METOCLOPRAMIDE 10 MG: 5 INJECTION, SOLUTION INTRAMUSCULAR; INTRAVENOUS at 21:36

## 2025-06-15 RX ADMIN — DIPHENHYDRAMINE HYDROCHLORIDE 25 MG: 50 INJECTION, SOLUTION INTRAMUSCULAR; INTRAVENOUS at 21:36

## 2025-06-15 RX ADMIN — ACETAMINOPHEN 975 MG: 325 TABLET ORAL at 21:37

## 2025-06-15 RX ADMIN — IOHEXOL 100 ML: 350 INJECTION, SOLUTION INTRAVENOUS at 22:44

## 2025-06-15 RX ADMIN — TETRACAINE HYDROCHLORIDE 2 DROP: 5 SOLUTION OPHTHALMIC at 21:40

## 2025-06-16 VITALS
RESPIRATION RATE: 17 BRPM | DIASTOLIC BLOOD PRESSURE: 68 MMHG | SYSTOLIC BLOOD PRESSURE: 112 MMHG | WEIGHT: 165 LBS | OXYGEN SATURATION: 97 % | HEART RATE: 73 BPM | BODY MASS INDEX: 25.9 KG/M2 | TEMPERATURE: 98.2 F | HEIGHT: 67 IN

## 2025-06-16 DIAGNOSIS — I77.3 FIBROMUSCULAR DYSPLASIA (HCC): Primary | ICD-10-CM

## 2025-06-16 RX ORDER — ASPIRIN 325 MG
325 TABLET ORAL ONCE
Status: COMPLETED | OUTPATIENT
Start: 2025-06-16 | End: 2025-06-16

## 2025-06-16 RX ADMIN — ASPIRIN 325 MG: 325 TABLET ORAL at 02:08

## 2025-06-16 NOTE — DISCHARGE INSTRUCTIONS
Start taking 81 mg of ASA daily.  Return to the ED if you develop difficulty seeing, dizziness, acutely worsening headache.     Is importantly follow-up with neurology in regards to your headache as well as ophthalmology in regards to your headache and mildly elevated eye pressures.

## 2025-06-16 NOTE — ED PROVIDER NOTES
Time reflects when diagnosis was documented in both MDM as applicable and the Disposition within this note       Time User Action Codes Description Comment    6/16/2025  1:18 AM Sha Grove Add [R51.9] Headache     6/16/2025  1:19 AM Sha Grove Add [I77.3] Fibromuscular dysplasia of carotid artery (HCC)     6/16/2025  1:24 AM Sha Grove Add [H53.9] Transient visual disturbance, left           ED Disposition       ED Disposition   Discharge    Condition   Stable    Date/Time   Mon Jun 16, 2025  2:05 AM    Comment   Jeovany MONROY Slish discharge to home/self care.                   Assessment & Plan       Medical Decision Making  55-year-old male presenting for evaluation of left-sided headache.  Located behind the left eye and left side of the head.  Sharp in nature.  Intermittent.  Occasional blurred vision and eye discomfort with it.  No neurologic deficits.  [place] includes migraine headache versus tension headache versus cluster headache versus temporal arteritis  Will obtain CTA head and neck to.  Will give migraine cocktail.  Will obtain ESR  ESR within normal limits.  Rest of labs within normal limits.  CTA shows no intracranial abnormality.  Does show possible fibromuscular dysplasia on the right ICA.  Believe this is incidental finding.  Patients visual acuity normal, eye pressure is mildly elevated bilaterally.  Not believe patient suffering from acute glaucoma.  Spoke with neurology, said to start on aspirin given CTA findings.  Patient feeling better at this time  Referral was placed for neurology, ophthalmology.  Given return precautions to the ED    Problems Addressed:  Fibromuscular dysplasia of carotid artery (HCC): acute illness or injury  Headache: acute illness or injury  Transient visual disturbance, left: acute illness or injury    Amount and/or Complexity of Data Reviewed  Labs: ordered.  Radiology: ordered.    Risk  OTC drugs.  Prescription drug management.        ED Course as of  06/16/25 0310   Sun Rachid 15, 2025   2357 Left eye pressure: 23.3  Right eye pressure 20.8   2358 Patient denies any visual disturbances or eye pain at this time       Medications   metoclopramide (REGLAN) injection 10 mg (10 mg Intravenous Given 6/15/25 2136)   diphenhydrAMINE (BENADRYL) injection 25 mg (25 mg Intravenous Given 6/15/25 2136)   acetaminophen (TYLENOL) tablet 975 mg (975 mg Oral Given 6/15/25 2137)   tetracaine 0.5 % ophthalmic solution 2 drop (2 drops Left Eye Given 6/15/25 2140)   iohexol (OMNIPAQUE) 350 MG/ML injection (MULTI-DOSE) 100 mL (100 mL Intravenous Given 6/15/25 2244)   aspirin tablet 325 mg (325 mg Oral Given 6/16/25 0208)       ED Risk Strat Scores                    No data recorded        SBIRT 22yo+      Flowsheet Row Most Recent Value   Initial Alcohol Screen: US AUDIT-C     1. How often do you have a drink containing alcohol? 6 Filed at: 06/15/2025 2044   2. How many drinks containing alcohol do you have on a typical day you are drinking?  1 Filed at: 06/15/2025 2044   3a. Male UNDER 65: How often do you have five or more drinks on one occasion? 0 Filed at: 06/15/2025 2044   3b. FEMALE Any Age, or MALE 65+: How often do you have 4 or more drinks on one occassion? 0 Filed at: 06/15/2025 2044   Audit-C Score 7 Filed at: 06/15/2025 2044   Full Alcohol Screen: US AUDIT    4. How often during the last year have you found that you were not able to stop drinking once you had started? 0 Filed at: 06/15/2025 2044   5. How often during past year have you failed to do what was normally expected of you because of drinking?  0 Filed at: 06/15/2025 2044   6. How often in past year have you needed a first drink in the morning to get yourself going after a heavy drinking session?  0 Filed at: 06/15/2025 2044   7. How often in past year have you had feeling of guilt or remorse after drinking?  0 Filed at: 06/15/2025 2044   8. How often in past year have you been unable to remember what happened  "night before because you had been drinking?  0 Filed at: 06/15/2025 2044   9. Have you or someone else been injured as a result of your drinking?  0 Filed at: 06/15/2025 2044   10. Has a relative, friend, doctor or other health worker been concerned about your drinking and suggested you cut down?  0 Filed at: 06/15/2025 2044   AUDIT Total Score 7 Filed at: 06/15/2025 2044   PRAKASH: How many times in the past year have you...    Used an illegal drug or used a prescription medication for non-medical reasons? Never Filed at: 06/15/2025 2044                            History of Present Illness       Chief Complaint   Patient presents with    Pain     Pt c/o of intermittent sudden sharp left side head pain, worsening w/ bending down x two days       Past Medical History[1]   Past Surgical History[2]   Family History[3]   Social History[4]   E-Cigarette/Vaping    E-Cigarette Use Former User       E-Cigarette/Vaping Substances    Nicotine No     THC No     CBD No     Flavoring No     Other No     Unknown No       I have reviewed and agree with the history as documented.     Patient is a 55-year-old male who presents for evaluation of a left-sided headache.  Patient says the symptoms have been intermittent over the last 2 days.  He says that it is a \"sharp pain\" that he gets behind the left eye and the left side of his head.  He says that sometimes he will have some eye discomfort with it and some blurred vision.  Patient says that he feels that the pain is worse when he gets up or bending forward.  Says it is different than headache he had before.  Says he took Tylenol with no relief.  He says the headache is mild at this time.        Review of Systems   Constitutional:  Negative for chills, fever and unexpected weight change.   HENT:  Negative for congestion, sore throat and trouble swallowing.    Eyes:  Negative for pain, discharge and itching.   Respiratory:  Negative for cough, chest tightness, shortness of breath and " wheezing.    Cardiovascular:  Negative for chest pain, palpitations and leg swelling.   Gastrointestinal:  Negative for abdominal pain, blood in stool, diarrhea, nausea and vomiting.   Endocrine: Negative for polyuria.   Genitourinary:  Negative for difficulty urinating, dysuria, frequency and hematuria.   Musculoskeletal:  Negative for arthralgias and back pain.   Skin:  Negative for color change and rash.   Neurological:  Positive for headaches. Negative for dizziness, syncope, weakness and light-headedness.           Objective       ED Triage Vitals [06/15/25 2044]   Temperature Pulse Blood Pressure Respirations SpO2 Patient Position - Orthostatic VS   98.2 °F (36.8 °C) 82 165/72 18 98 % Sitting      Temp src Heart Rate Source BP Location FiO2 (%) Pain Score    -- Monitor Left arm -- 2      Vitals      Date and Time Temp Pulse SpO2 Resp BP Pain Score FACES Pain Rating User   06/16/25 0208 -- 73 97 % 17 112/68 -- -- AF   06/16/25 0100 -- 74 97 % 16 110/64 -- --    06/15/25 2330 -- 83 94 % 17 108/51 -- -- AF   06/15/25 2300 -- 76 97 % 18 107/59 5 -- AF   06/15/25 2200 -- 80 95 % 17 106/74 -- --    06/15/25 2137 -- -- -- -- -- 5 -- AF   06/15/25 2044 98.2 °F (36.8 °C) 82 98 % 18 165/72 2 -- TAB            Physical Exam  Vitals and nursing note reviewed.   Constitutional:       General: He is not in acute distress.     Appearance: He is well-developed.   HENT:      Head: Normocephalic and atraumatic.      Right Ear: External ear normal.      Left Ear: External ear normal.     Eyes:      Extraocular Movements: Extraocular movements intact.      Conjunctiva/sclera: Conjunctivae normal.      Pupils: Pupils are equal, round, and reactive to light.       Cardiovascular:      Rate and Rhythm: Normal rate and regular rhythm.      Heart sounds: Normal heart sounds. No murmur heard.     No friction rub. No gallop.   Pulmonary:      Effort: Pulmonary effort is normal. No respiratory distress.      Breath sounds: Normal  breath sounds. No wheezing or rales.   Abdominal:      General: Bowel sounds are normal. There is no distension.      Palpations: Abdomen is soft.      Tenderness: There is no abdominal tenderness. There is no guarding.     Musculoskeletal:         General: No swelling, tenderness or deformity. Normal range of motion.      Cervical back: Normal range of motion.   Lymphadenopathy:      Cervical: No cervical adenopathy.     Skin:     General: Skin is warm and dry.     Neurological:      General: No focal deficit present.      Mental Status: He is alert and oriented to person, place, and time. Mental status is at baseline.      Cranial Nerves: No cranial nerve deficit.      Sensory: No sensory deficit.      Motor: No weakness or abnormal muscle tone.     Psychiatric:         Behavior: Behavior normal.         Results Reviewed       Procedure Component Value Units Date/Time    Basic metabolic panel [[id number]] Collected: 06/15/25 2135    Lab Status: Final result Specimen: Blood from Arm, Right Updated: 06/15/25 2202     Sodium 137 mmol/L      Potassium 3.8 mmol/L      Chloride 103 mmol/L      CO2 26 mmol/L      ANION GAP 8 mmol/L      BUN 18 mg/dL      Creatinine 1.20 mg/dL      Glucose 101 mg/dL      Calcium 9.3 mg/dL      eGFR 68 ml/min/1.73sq m     Narrative:      National Kidney Disease Foundation guidelines for Chronic Kidney Disease (CKD):     Stage 1 with normal or high GFR (GFR > 90 mL/min/1.73 square meters)    Stage 2 Mild CKD (GFR = 60-89 mL/min/1.73 square meters)    Stage 3A Moderate CKD (GFR = 45-59 mL/min/1.73 square meters)    Stage 3B Moderate CKD (GFR = 30-44 mL/min/1.73 square meters)    Stage 4 Severe CKD (GFR = 15-29 mL/min/1.73 square meters)    Stage 5 End Stage CKD (GFR <15 mL/min/1.73 square meters)  Note: GFR calculation is accurate only with a steady state creatinine    Sedimentation rate, automated [[id number]]  (Normal) Collected: 06/15/25 2135    Lab Status: Final result Specimen: Blood from  Arm, Right Updated: 06/15/25 2156     Sed Rate <1 mm/hour     CBC and differential [[id number]]  (Abnormal) Collected: 06/15/25 2135    Lab Status: Final result Specimen: Blood from Arm, Right Updated: 06/15/25 2149     WBC 5.83 Thousand/uL      RBC 3.85 Million/uL      Hemoglobin 13.0 g/dL      Hematocrit 37.4 %      MCV 97 fL      MCH 33.8 pg      MCHC 34.8 g/dL      RDW 12.0 %      MPV 10.0 fL      Platelets 143 Thousands/uL      nRBC 0 /100 WBCs      Segmented % 50 %      Immature Grans % 1 %      Lymphocytes % 32 %      Monocytes % 13 %      Eosinophils Relative 2 %      Basophils Relative 2 %      Absolute Neutrophils 2.93 Thousands/µL      Absolute Immature Grans 0.06 Thousand/uL      Absolute Lymphocytes 1.87 Thousands/µL      Absolute Monocytes 0.75 Thousand/µL      Eosinophils Absolute 0.12 Thousand/µL      Basophils Absolute 0.10 Thousands/µL             CTA head and neck with and without contrast    (Results Pending)       Procedures    ED Medication and Procedure Management   Prior to Admission Medications   Prescriptions Last Dose Informant Patient Reported? Taking?   losartan (COZAAR) 100 MG tablet 6/15/2025 Self No Yes   Sig: take 1 tablet by mouth once daily   omeprazole (PriLOSEC) 20 mg delayed release capsule 6/15/2025 Self Yes Yes   Sig: Take 1 capsule by mouth in the morning.   rosuvastatin (CRESTOR) 5 mg tablet 6/15/2025 Self No Yes   Sig: take 1 tablet by mouth once daily   triamterene-hydrochlorothiazide (MAXZIDE-25) 37.5-25 mg per tablet 6/15/2025 Self No Yes   Sig: take 1 tablet by mouth once daily      Facility-Administered Medications: None     Discharge Medication List as of 6/16/2025  2:06 AM        CONTINUE these medications which have NOT CHANGED    Details   losartan (COZAAR) 100 MG tablet take 1 tablet by mouth once daily, Starting Tue 1/14/2025, Normal      omeprazole (PriLOSEC) 20 mg delayed release capsule Take 1 capsule by mouth in the morning., Historical Med      rosuvastatin  (CRESTOR) 5 mg tablet take 1 tablet by mouth once daily, Starting Mon 3/10/2025, Normal      triamterene-hydrochlorothiazide (MAXZIDE-25) 37.5-25 mg per tablet take 1 tablet by mouth once daily, Starting Fri 3/21/2025, Normal             ED SEPSIS DOCUMENTATION   Time reflects when diagnosis was documented in both MDM as applicable and the Disposition within this note       Time User Action Codes Description Comment    6/16/2025  1:18 AM Sha Grove [R51.9] Headache     6/16/2025  1:19 AM Sha Grove [I77.3] Fibromuscular dysplasia of carotid artery (HCC)     6/16/2025  1:24 AM Sha Grove [H53.9] Transient visual disturbance, left                      [1]   Past Medical History:  Diagnosis Date    Allergic     Chronic kidney disease     Episode of Reduced GFR 2017    GERD (gastroesophageal reflux disease)     Hypertension     CORY (obstructive sleep apnea)     Sleep apnea    [2]   Past Surgical History:  Procedure Laterality Date    CERVICAL DISC SURGERY      SPINE SURGERY  2000    TONSILLECTOMY  1978   [3]   Family History  Problem Relation Name Age of Onset    Breast cancer Mother Mom     COPD Father Michael Bateman     Alcohol abuse Father Michael Bateman     Coronary artery disease Father Michael Bateman     Hypertension Father Michael Bateman     Thyroid cancer Sister      Multiple sclerosis Sister      Alcohol abuse Brother 47     No Known Problems Son      Hypertension Family          Benign essential hypertension     Heart disease Family      Lung disease Family      Cancer Family      Cancer Paternal Grandfather Michael Sr         Lung    Hypertension Paternal Grandfather Michael Sr     Lung cancer Paternal Grandfather Michael Sr     Heart failure Maternal Grandfather Lee    [4]   Social History  Tobacco Use    Smoking status: Former     Current packs/day: 0.00     Average packs/day: 1 pack/day for 25.2 years (25.2 ttl pk-yrs)     Types: Cigarettes     Start date: 1994     Quit date: 3/15/2019     Years since  quittin.2     Passive exposure: Past    Smokeless tobacco: Never   Vaping Use    Vaping status: Former   Substance Use Topics    Alcohol use: Yes     Alcohol/week: 20.0 standard drinks of alcohol     Types: 20 Cans of beer per week     Comment: 3-4 beer daily    Drug use: No        Sha Grove DO  25 0310

## 2025-06-17 ENCOUNTER — TELEPHONE (OUTPATIENT)
Age: 55
End: 2025-06-17

## 2025-06-19 ENCOUNTER — NURSE TRIAGE (OUTPATIENT)
Age: 55
End: 2025-06-19

## 2025-06-19 NOTE — TELEPHONE ENCOUNTER
"REASON FOR CONVERSATION: Shortness of Breath  Patient calling, he was seen in ED for headache, found to have \"Beaded irregularity of the cervical ICAs, right worse than left, correlate clinically for fibromuscular dysplasia.\" On CTA head/neck.  Patient has been experiencing SOB on exertion, with heart rate increasing to 140s with minimal exertion for the past year, becoming increasingly worse over the past month. He is concerned symptoms are related to carotid disease and would like to be seen by cardiology.      SYMPTOMS: SOB with exertion, SOB when bending over,      OTHER HEALTH INFORMATION: See results from ED visit on 6/15/25    PROTOCOL DISPOSITION: See Within 3 Days in Office    CARE ADVICE PROVIDED: Advised patient to call back or seek ED evaluation with worsening/urgent symptoms.  Patient verbalized understanding.     PRACTICE FOLLOW-UP:  New patient appointment scheduled for 7/24/25, applied to wait list.    Reason for Disposition   MODERATE longstanding difficulty breathing (e.g., speaks in phrases, SOB even at rest, pulse 100-120) and SAME as normal    Answer Assessment - Initial Assessment Questions  1. RESPIRATORY STATUS: \"Describe your breathing?\" (e.g., wheezing, shortness of breath, unable to speak, severe coughing)       SOB with exertion, only SOB at rest with anxiety   2. ONSET: \"When did this breathing problem begin?\"       Past year, on and off, increasingly worse over last month  3. PATTERN \"Does the difficult breathing come and go, or has it been constant since it started?\"       Off and on   4. SEVERITY: \"How bad is your breathing?\" (e.g., mild, moderate, severe)       moderate  5. RECURRENT SYMPTOM: \"Have you had difficulty breathing before?\" If Yes, ask: \"When was the last time?\" and \"What happened that time?\"       denies  6. CARDIAC HISTORY: \"Do you have any history of heart disease?\" (e.g., heart attack, angina, bypass surgery, angioplasty)       HLD, HTN  7. LUNG HISTORY: \"Do you have " "any history of lung disease?\"  (e.g., pulmonary embolus, asthma, emphysema)      Pneumonia- distant past, treated   8. CAUSE: \"What do you think is causing the breathing problem?\"       unsure  9. OTHER SYMPTOMS: \"Do you have any other symptoms?\" (e.g., chest pain, cough, dizziness, fever, runny nose)      SOB when bending over, HR goes up to 140s with minimal exertion;  dizziness with too much exertion     10. O2 SATURATION MONITOR:  \"Do you use an oxygen saturation monitor (pulse oximeter) at home?\" If Yes, ask: \"What is your reading (oxygen level) today?\" \"What is your usual oxygen saturation reading?\" (e.g., 95%)        94-98% at rest  12. TRAVEL: \"Have you traveled out of the country in the last month?\" (e.g., travel history, exposures)        denies    Protocols used: Breathing Difficulty-Adult-[place]    "

## 2025-07-17 ENCOUNTER — TELEPHONE (OUTPATIENT)
Dept: NEPHROLOGY | Facility: CLINIC | Age: 55
End: 2025-07-17

## 2025-07-17 NOTE — TELEPHONE ENCOUNTER
I called and left a message on machine for patient to return our call about scheduling his 9 month nephrology follow up appointment with Dr. TACHO Gu for on or after 2/9/2026 from our recall list.

## 2025-07-21 DIAGNOSIS — I10 ESSENTIAL HYPERTENSION: ICD-10-CM

## 2025-07-23 RX ORDER — LOSARTAN POTASSIUM 100 MG/1
100 TABLET ORAL DAILY
Qty: 90 TABLET | Refills: 0 | Status: SHIPPED | OUTPATIENT
Start: 2025-07-23

## 2025-07-23 NOTE — TELEPHONE ENCOUNTER
Call from patient's spouse, Radha, advising patient only has 2 pills of the Los[place] left.     She declined to schedule an appointment due to the many specialists appointments he has at this time. She advised she will let him know one should be scheduled.     Please send in script or call Radha to advise. Thank you.

## 2025-07-24 ENCOUNTER — OFFICE VISIT (OUTPATIENT)
Dept: CARDIOLOGY CLINIC | Facility: CLINIC | Age: 55
End: 2025-07-24
Payer: COMMERCIAL

## 2025-07-24 VITALS
TEMPERATURE: 97.8 F | WEIGHT: 173.4 LBS | BODY MASS INDEX: 27.21 KG/M2 | DIASTOLIC BLOOD PRESSURE: 80 MMHG | SYSTOLIC BLOOD PRESSURE: 120 MMHG | HEIGHT: 67 IN | OXYGEN SATURATION: 98 % | RESPIRATION RATE: 18 BRPM | HEART RATE: 82 BPM

## 2025-07-24 DIAGNOSIS — I65.23 BILATERAL CAROTID ARTERY STENOSIS: ICD-10-CM

## 2025-07-24 DIAGNOSIS — R06.02 SOB (SHORTNESS OF BREATH): ICD-10-CM

## 2025-07-24 DIAGNOSIS — G47.33 OSA (OBSTRUCTIVE SLEEP APNEA): ICD-10-CM

## 2025-07-24 DIAGNOSIS — R00.2 PALPITATIONS: ICD-10-CM

## 2025-07-24 DIAGNOSIS — E78.2 MIXED HYPERLIPIDEMIA: ICD-10-CM

## 2025-07-24 DIAGNOSIS — R07.9 CHEST PAIN, UNSPECIFIED TYPE: ICD-10-CM

## 2025-07-24 DIAGNOSIS — I10 PRIMARY HYPERTENSION: Primary | ICD-10-CM

## 2025-07-24 PROCEDURE — 93000 ELECTROCARDIOGRAM COMPLETE: CPT | Performed by: INTERNAL MEDICINE

## 2025-07-24 PROCEDURE — 99204 OFFICE O/P NEW MOD 45 MIN: CPT | Performed by: INTERNAL MEDICINE

## 2025-07-24 RX ORDER — OMEGA-3S/DHA/EPA/FISH OIL/D3 300MG-1000
500 CAPSULE ORAL DAILY
COMMUNITY

## 2025-07-24 RX ORDER — ROSUVASTATIN CALCIUM 10 MG/1
10 TABLET, COATED ORAL DAILY
Qty: 90 TABLET | Refills: 2 | Status: SHIPPED | OUTPATIENT
Start: 2025-07-24

## 2025-07-24 RX ORDER — METOPROLOL TARTRATE 50 MG
50 TABLET ORAL ONCE
Qty: 1 TABLET | Refills: 0 | Status: SHIPPED | OUTPATIENT
Start: 2025-07-24 | End: 2025-07-24

## 2025-07-24 NOTE — ASSESSMENT & PLAN NOTE
- Patient notes blood pressures at home are well-controlled we will continue losartan 100 mg daily and triamterene-HCTZ 37.5-25 mg daily managed by nephrology  -Continue to monitor

## 2025-07-24 NOTE — PROGRESS NOTES
Patient ID: Jeovany Paul is a 55 y.o. male.        Plan:      Assessment & Plan  Primary hypertension  - Patient notes blood pressures at home are well-controlled we will continue losartan 100 mg daily and triamterene-HCTZ 37.5-25 mg daily managed by nephrology  -Continue to monitor  CORY (obstructive sleep apnea)  - Counseled patient on dietary and lifestyle modifications along continue compliance with CPAP therapy  -Continue to monitor  Mixed hyperlipidemia  - Will increase Crestor to 10 mg daily and monitor response  -Counseled patient on dietary lifestyle modifications  -Continue to monitor  Chest pain, unspecified type  - Given underlying risk factors, symptomatology and previous concern for possible FMD will have patient undergo coronary CTA for further evaluation along with transthoracic echocardiogram  -Continue to monitor  SOB (shortness of breath)  - Counseled patient on dietary and lifestyle modifications  -Follow-up transthoracic echocardiogram results  -Continue to monitor  Palpitations  - Will check 2-week Zio patch monitor with additional recommendations pending results  Bilateral carotid artery stenosis  - Patient currently denies active symptoms but will be following up with vascular team in the near future for further evaluation  - Will increase Crestor to 10 mg daily  - Continue to monitor      Follow up Plan/Other summary comments:  - Fasting lipid panel 4/20/2025 showing total cholesterol 179, triglyceride 182, HDL 61, LDL 82  - Counseled patient on dietary and lifestyle modifications including following a low-salt, low-fat, heart healthy diet and sodium restriction to less than 1800 mg of sodium daily, DASH diet, NSAID avoidance and need for alcohol cessation safely which can be monitored by his primary care physician  - Given symptomatology and underlying risk factors along with previous testing and concern for imaging on recent CT will have patient undergo coronary CTA for further evaluation  of coronary anatomy and possible issues with FMD as well  - Patient will take metoprolol to tartrate 50 mg tablet 1 hour prior to coronary CTA will check BMP 1 week prior for monitoring of renal function  - Patient will also undergo 2-week Zio patch monitor and transthoracic echocardiogram with additional recommendations pending results  - Patient recently initiated on aspirin enteric-coated 81 mg tablet and will monitor and follow-up with vascular team and nephrology  - I will increase patient's Crestor from 5 mg daily to 10 mg daily and he will monitor for response  - I will see patient in 6 months or sooner if necessary pending results of testing  - Patient will monitor home blood pressure readings let our office or his primary care physician's office know if significantly elevated greater than 130/80's mmHg for up titration of medical therapy  - Patient counseled if he were to have any warning or alarm type symptoms he is to seek emergency medical care immediately    HPI:   - Patient is a 55-year-old male with documented hypertension, obstructive sleep apnea compliant with CPAP therapy, GERD and fatty liver disease, chronic kidney disease stage II followed by nephrology along with hyperlipidemia, carotid stenosis with questionable FMD who is also noting a history of ischemic colitis previously followed by gastroenterology.  He recently initiated aspirin 81 mg daily and presents to the office today for evaluation.  He notes that approximately over the past year and a half or so he has developed some intermittent palpitations along with chest discomfort and shortness of breath.  He states these are not consistent and happening on various occasions and with differing degrees of activity or even sometimes at rest.  He notes that there is not a specific set of triggers that consistently causes the issue but he has noticed some worsening over the last several months which has caused him some concern.  - Currently in  "the office today he denies any active chest pain, palpitations, lightheadedness or dizziness, loss of consciousness, shortness of breath, lower extremity edema, orthopnea or bendopnea.  - Patient has blood pressures at home are well-controlled  - Patient denies any tobacco or illicit drug use and has daily alcohol use anywhere from 4-6 beers per day.  He denies any withdrawal-like symptoms when he stops this.  - Patient notes family history of heart disease with maternal grandfather having MI at age 75.      Most recent or relevant cardiac/vascular testing:    - ECG performed in the office today shows sinus rhythm heart rate 83 bpm.      Past Surgical History[1]    Review of Systems   Review of Systems   Constitutional:  Negative for chills, diaphoresis, fatigue and fever.   HENT:  Negative for trouble swallowing and voice change.    Eyes:  Negative for pain and redness.   Respiratory:  Negative for shortness of breath and wheezing.    Cardiovascular:  Negative for chest pain, palpitations and leg swelling.   Gastrointestinal:  Negative for abdominal pain, blood in stool, constipation, diarrhea, nausea and vomiting.   Genitourinary:  Negative for dysuria.   Musculoskeletal:  Positive for arthralgias. Negative for neck pain and neck stiffness.   Skin:  Negative for rash.   Neurological:  Negative for dizziness, syncope, light-headedness and headaches.   Psychiatric/Behavioral:  Negative for agitation and confusion.    All other systems reviewed and are negative.         Objective:     /80 (BP Location: Right arm, Patient Position: Sitting, Cuff Size: Standard)   Pulse 82   Temp 97.8 °F (36.6 °C) (Temporal)   Resp 18   Ht 5' 7\" (1.702 m)   Wt 78.7 kg (173 lb 6.4 oz)   SpO2 98%   BMI 27.16 kg/m²     PHYSICAL EXAM:  Physical Exam  Vitals reviewed.   Constitutional:       General: He is not in acute distress.     Appearance: Normal appearance. He is not diaphoretic.   HENT:      Head: Normocephalic and " atraumatic.     Eyes:      General:         Right eye: No discharge.         Left eye: No discharge.     Neck:      Comments: Trachea midline, no significant JVD appreciated  Cardiovascular:      Rate and Rhythm: Normal rate and regular rhythm.      Heart sounds:      No friction rub.   Pulmonary:      Effort: Pulmonary effort is normal. No respiratory distress.      Breath sounds: No wheezing.   Chest:      Chest wall: No tenderness.   Abdominal:      General: There is no distension.      Palpations: Abdomen is soft.      Tenderness: There is no abdominal tenderness. There is no rebound.     Musculoskeletal:      Right lower leg: No edema.      Left lower leg: No edema.     Skin:     General: Skin is warm and dry.     Neurological:      Mental Status: He is alert.      Comments: Awake, alert, able to answer questions appropriately, able to move extremities bilaterally.   Psychiatric:         Mood and Affect: Mood normal.         Behavior: Behavior normal.          Meds reviewed.  Medications Ordered Prior to Encounter[2]   Past Medical History[3]        Tobacco Use History[4]  Family History[5]               [1]   Past Surgical History:  Procedure Laterality Date    CERVICAL DISC SURGERY      SPINE SURGERY  2000    TONSILLECTOMY  1978   [2]   Current Outpatient Medications on File Prior to Visit   Medication Sig Dispense Refill    cholecalciferol (VITAMIN D3) 400 units tablet Take 500 Units by mouth in the morning.      losartan (COZAAR) 100 MG tablet Take 1 tablet (100 mg total) by mouth daily 90 tablet 0    omeprazole (PriLOSEC) 20 mg delayed release capsule Take 1 capsule by mouth in the morning.      rosuvastatin (CRESTOR) 5 mg tablet take 1 tablet by mouth once daily 90 tablet 1    triamterene-hydrochlorothiazide (MAXZIDE-25) 37.5-25 mg per tablet take 1 tablet by mouth once daily 90 tablet 1     No current facility-administered medications on file prior to visit.   [3]   Past Medical History:  Diagnosis Date     Allergic     Chronic kidney disease     Episode of Reduced GFR     GERD (gastroesophageal reflux disease)     Hypertension     CORY (obstructive sleep apnea)     Sleep apnea    [4]   Social History  Tobacco Use   Smoking Status Former    Current packs/day: 0.00    Average packs/day: 1 pack/day for 25.2 years (25.2 ttl pk-yrs)    Types: Cigarettes    Start date:     Quit date: 3/15/2019    Years since quittin.3    Passive exposure: Past   Smokeless Tobacco Never   [5]   Family History  Problem Relation Name Age of Onset    Breast cancer Mother Mom     COPD Father Michael Bateman     Alcohol abuse Father Michael Jr     Coronary artery disease Father Michael Bateman     Hypertension Father Michael Bateman     Thyroid cancer Sister      Multiple sclerosis Sister      Alcohol abuse Brother 47     No Known Problems Son      Hypertension Family          Benign essential hypertension     Heart disease Family      Lung disease Family      Cancer Family      Cancer Paternal Grandfather Mcihael Sr         Lung    Hypertension Paternal Grandfather Mihcael Sr     Lung cancer Paternal Grandfather Michael Sr     Heart failure Maternal Grandfather Lee

## 2025-07-24 NOTE — ASSESSMENT & PLAN NOTE
- Will increase Crestor to 10 mg daily and monitor response  -Counseled patient on dietary lifestyle modifications  -Continue to monitor

## 2025-07-29 ENCOUNTER — TELEPHONE (OUTPATIENT)
Dept: NON INVASIVE DIAGNOSTICS | Facility: HOSPITAL | Age: 55
End: 2025-07-29

## 2025-07-29 ENCOUNTER — TELEPHONE (OUTPATIENT)
Dept: ADMINISTRATIVE | Facility: HOSPITAL | Age: 55
End: 2025-07-29

## 2025-07-29 DIAGNOSIS — R07.9 CHEST PAIN, UNSPECIFIED TYPE: Primary | ICD-10-CM

## 2025-08-04 ENCOUNTER — HOSPITAL ENCOUNTER (OUTPATIENT)
Dept: NON INVASIVE DIAGNOSTICS | Facility: HOSPITAL | Age: 55
Discharge: HOME | End: 2025-08-04
Attending: INTERNAL MEDICINE
Payer: COMMERCIAL

## 2025-08-04 VITALS
HEIGHT: 67 IN | BODY MASS INDEX: 27.15 KG/M2 | WEIGHT: 173 LBS | HEART RATE: 70 BPM | DIASTOLIC BLOOD PRESSURE: 80 MMHG | SYSTOLIC BLOOD PRESSURE: 120 MMHG

## 2025-08-04 DIAGNOSIS — R07.9 CHEST PAIN, UNSPECIFIED TYPE: ICD-10-CM

## 2025-08-04 DIAGNOSIS — R06.02 SOB (SHORTNESS OF BREATH): ICD-10-CM

## 2025-08-04 DIAGNOSIS — R00.2 PALPITATIONS: ICD-10-CM

## 2025-08-04 LAB
AORTIC ROOT: 2.9 CM
BSA FOR ECHO PROCEDURE: 1.9 M2
DOP CALC LVOT AREA: 2.54 CM2
DOP CALC LVOT DIAMETER: 1.8 CM
E WAVE DECELERATION TIME: 325 MS
E/A RATIO: 0.74
FRACTIONAL SHORTENING: 29 (ref 28–44)
INTERVENTRICULAR SEPTUM IN DIASTOLE (PARASTERNAL SHORT AXIS VIEW): 0.7 CM
INTERVENTRICULAR SEPTUM: 0.7 CM (ref 0.6–1.1)
LAAS-AP2: 20.3 CM2
LAAS-AP4: 20 CM2
LEFT ATRIUM SIZE: 3.1 CM
LEFT ATRIUM VOLUME (MOD BIPLANE): 57 ML
LEFT ATRIUM VOLUME INDEX (MOD BIPLANE): 30 ML/M2
LEFT INTERNAL DIMENSION IN SYSTOLE: 2.9 CM (ref 2.1–4)
LEFT VENTRICULAR INTERNAL DIMENSION IN DIASTOLE: 4.1 CM (ref 3.5–6)
LEFT VENTRICULAR POSTERIOR WALL IN END DIASTOLE: 0.6 CM
LEFT VENTRICULAR STROKE VOLUME: 43 ML
LV EF US.2D.A4C+ESTIMATED: 61 %
LVSV (TEICH): 43 ML
MV E'TISSUE VEL-SEP: 10 CM/S
MV PEAK A VEL: 0.66 M/S
MV PEAK E VEL: 49 CM/S
MV STENOSIS PRESSURE HALF TIME: 94 MS
MV VALVE AREA P 1/2 METHOD: 2.34
RIGHT ATRIUM AREA SYSTOLE A4C: 15.7 CM2
RIGHT VENTRICLE ID DIMENSION: 3.3 CM
SL CV LEFT ATRIUM LENGTH A2C: 5.4 CM
SL CV LV EF: 55
SL CV PED ECHO LEFT VENTRICLE DIASTOLIC VOLUME (MOD BIPLANE) 2D: 74 ML
SL CV PED ECHO LEFT VENTRICLE SYSTOLIC VOLUME (MOD BIPLANE) 2D: 31 ML
TRICUSPID ANNULAR PLANE SYSTOLIC EXCURSION: 1.8 CM

## 2025-08-04 PROCEDURE — 93306 TTE W/DOPPLER COMPLETE: CPT

## 2025-08-04 PROCEDURE — 93306 TTE W/DOPPLER COMPLETE: CPT | Performed by: INTERNAL MEDICINE

## 2025-08-05 ENCOUNTER — HOSPITAL ENCOUNTER (OUTPATIENT)
Dept: CT IMAGING | Facility: HOSPITAL | Age: 55
Discharge: HOME | End: 2025-08-05
Attending: INTERNAL MEDICINE

## 2025-08-06 ENCOUNTER — HOSPITAL ENCOUNTER (OUTPATIENT)
Dept: NON INVASIVE DIAGNOSTICS | Facility: HOSPITAL | Age: 55
Discharge: HOME | End: 2025-08-06
Attending: INTERNAL MEDICINE
Payer: COMMERCIAL

## 2025-08-06 VITALS — HEART RATE: 82 BPM | SYSTOLIC BLOOD PRESSURE: 108 MMHG | OXYGEN SATURATION: 98 % | DIASTOLIC BLOOD PRESSURE: 68 MMHG

## 2025-08-06 DIAGNOSIS — R07.9 CHEST PAIN, UNSPECIFIED TYPE: ICD-10-CM

## 2025-08-06 LAB
MAX HR PERCENT: 94 %
MAX HR: 157 BPM
RATE PRESSURE PRODUCT: NORMAL
SL CV STRESS RECOVERY BP: NORMAL MMHG
SL CV STRESS RECOVERY HR: 97 BPM
SL CV STRESS RECOVERY O2 SAT: 100 %
STRESS ANGINA INDEX: 1
STRESS BASELINE BP: NORMAL MMHG
STRESS BASELINE HR: 82 BPM
STRESS O2 SAT REST: 98 %
STRESS PEAK HR: 157 BPM
STRESS POST ESTIMATED WORKLOAD: 10.4 METS
STRESS POST EXERCISE DUR MIN: 8 MIN
STRESS POST EXERCISE DUR SEC: 0 SEC
STRESS POST O2 SAT PEAK: 99 %
STRESS POST PEAK BP: 158 MMHG

## 2025-08-06 PROCEDURE — 93018 CV STRESS TEST I&R ONLY: CPT | Performed by: INTERNAL MEDICINE

## 2025-08-06 PROCEDURE — 93016 CV STRESS TEST SUPVJ ONLY: CPT | Performed by: INTERNAL MEDICINE

## 2025-08-06 PROCEDURE — 93017 CV STRESS TEST TRACING ONLY: CPT

## 2025-08-07 ENCOUNTER — RESULTS FOLLOW-UP (OUTPATIENT)
Dept: CARDIOLOGY CLINIC | Facility: CLINIC | Age: 55
End: 2025-08-07

## 2025-08-07 LAB
CHEST PAIN STATEMENT: NORMAL
MAX DIASTOLIC BP: 76 MMHG
MAX PREDICTED HEART RATE: 166 BPM
PROTOCOL NAME: NORMAL
REASON FOR TERMINATION: NORMAL
STRESS POST EXERCISE DUR MIN: 8 MIN
STRESS POST EXERCISE DUR SEC: 0 SEC
STRESS POST PEAK HR: 157 BPM
STRESS POST PEAK SYSTOLIC BP: 158 MMHG
TARGET HR FORMULA: NORMAL
TEST INDICATION: NORMAL

## 2025-08-08 ENCOUNTER — OFFICE VISIT (OUTPATIENT)
Dept: VASCULAR SURGERY | Facility: CLINIC | Age: 55
End: 2025-08-08
Payer: COMMERCIAL

## 2025-08-08 VITALS
BODY MASS INDEX: 27.15 KG/M2 | HEIGHT: 67 IN | WEIGHT: 173 LBS | SYSTOLIC BLOOD PRESSURE: 128 MMHG | DIASTOLIC BLOOD PRESSURE: 84 MMHG | HEART RATE: 86 BPM

## 2025-08-08 DIAGNOSIS — N18.2 STAGE 2 CHRONIC KIDNEY DISEASE: ICD-10-CM

## 2025-08-08 DIAGNOSIS — E66.3 OVERWEIGHT (BMI 25.0-29.9): ICD-10-CM

## 2025-08-08 DIAGNOSIS — E78.2 MIXED HYPERLIPIDEMIA: Primary | ICD-10-CM

## 2025-08-08 DIAGNOSIS — I77.3 FIBROMUSCULAR DYSPLASIA (HCC): ICD-10-CM

## 2025-08-08 DIAGNOSIS — I10 PRIMARY HYPERTENSION: ICD-10-CM

## 2025-08-08 PROCEDURE — 99243 OFF/OP CNSLTJ NEW/EST LOW 30: CPT | Performed by: SURGERY

## 2025-08-13 ENCOUNTER — CLINICAL SUPPORT (OUTPATIENT)
Dept: CARDIOLOGY CLINIC | Facility: CLINIC | Age: 55
End: 2025-08-13
Payer: COMMERCIAL

## 2025-08-14 ENCOUNTER — HOSPITAL ENCOUNTER (OUTPATIENT)
Dept: VASCULAR ULTRASOUND | Facility: HOSPITAL | Age: 55
Discharge: HOME | End: 2025-08-14
Attending: SURGERY
Payer: COMMERCIAL